# Patient Record
Sex: FEMALE | Employment: OTHER | URBAN - METROPOLITAN AREA
[De-identification: names, ages, dates, MRNs, and addresses within clinical notes are randomized per-mention and may not be internally consistent; named-entity substitution may affect disease eponyms.]

---

## 2018-05-07 ENCOUNTER — HOSPITAL ENCOUNTER (OUTPATIENT)
Age: 83
Discharge: HOME | End: 2018-05-09

## 2018-05-07 PROCEDURE — 88360 TUMOR IMMUNOHISTOCHEM/MANUAL: CPT

## 2018-05-07 PROCEDURE — 88305 TISSUE EXAM BY PATHOLOGIST: CPT

## 2018-10-09 ENCOUNTER — PREP FOR PROCEDURE (OUTPATIENT)
Dept: SURGERY | Age: 83
End: 2018-10-09

## 2018-10-09 RX ORDER — SODIUM CHLORIDE, SODIUM LACTATE, POTASSIUM CHLORIDE, CALCIUM CHLORIDE 600; 310; 30; 20 MG/100ML; MG/100ML; MG/100ML; MG/100ML
INJECTION, SOLUTION INTRAVENOUS CONTINUOUS
Status: CANCELLED | OUTPATIENT
Start: 2018-10-09 | End: 2019-10-09

## 2018-10-09 RX ORDER — SODIUM CHLORIDE 0.9 % (FLUSH) 0.9 %
10 SYRINGE (ML) INJECTION EVERY 12 HOURS SCHEDULED
Status: CANCELLED | OUTPATIENT
Start: 2018-10-09 | End: 2019-10-09

## 2018-10-09 NOTE — H&P
Date:   10/08/18COMPREHENSIVE SURGICAL GROUP [facility]  Name: So Selby                   : 1935 Sex: F  Age: 81 yrs  Acct#:  [id number]            Patient was referred by Sugar Del Valle MD.  Patient's primary care provider is Sugar Del Valle MD.  CC: Patient presents for follow up of chemotherapy of right breast    HPI: Follow-up. Patient denied any new complaints. Meds Prior to Visit:  Synthroid     Amitriptyline HCL     Losartan Potassium     Omeprazole     Citalopram Hydrobromide     Klor-Con M20     Ferrous Sulfate        Allergies:  Penicillin, Aspirin         Wt Prior: 155lb as of 10/06/15    Exam:  Const: Appears healthy and well developed. No signs of apparent distress present. Head/Face: Atraumatic, normocephalic on inspection. Eyes: Conjunctivae pink. Pupils equal, round and reactive to light. Sclerae clear and anicteric. ENMT: External ears WNL. External nose WNL. Lips: Appear normal and healthy. Neck: Normal to inspection. Normal to palpation. No masses appreciated. Trachea midline. Thyroid is normal in size. No jugular venous distention. Resp: Respiration rate is normal. No wheezing. Clear to auscultation bilaterally. No rales or rhonchi appreciated over the lungs bilaterally. CV: Rate is regular. Rhythm is regular. S1 is normal. S2 is normal. No heart murmur appreciated. Extremities: No clubbing or cyanosis. No edema of the lower limbs bilaterally. Abdomen: No visible herniations. No abdominal scars. Positive bowel sounds in all quadrants. Abdomen is soft, nontender, and nondistended without guarding, rigidity or rebound tenderness. No palpable abdominal aneurysms present. No palpable hepatosplenomegaly. Lymph: No palpable lymphadenopathy in the cervical, supraclavicular, axillary and inguinal region(s). Musculo: Walks with a normal gait. Upper Extremities: Normal to inspection. ROM: Full ROM bilaterally. Lower Extremities: Normal to inspection. ROM: Full ROM bilaterally.   Skin: Skin

## 2018-10-23 RX ORDER — LOPERAMIDE HYDROCHLORIDE 2 MG/1
2 CAPSULE ORAL 4 TIMES DAILY PRN
COMMUNITY
End: 2019-09-25

## 2018-10-26 ENCOUNTER — APPOINTMENT (OUTPATIENT)
Dept: GENERAL RADIOLOGY | Age: 83
End: 2018-10-26
Attending: SURGERY
Payer: MEDICARE

## 2018-10-26 ENCOUNTER — HOSPITAL ENCOUNTER (OUTPATIENT)
Age: 83
Setting detail: OUTPATIENT SURGERY
Discharge: HOME | End: 2018-10-26
Attending: SURGERY | Admitting: SURGERY
Payer: MEDICARE

## 2018-10-26 ENCOUNTER — ANESTHESIA EVENT (OUTPATIENT)
Dept: OPERATING ROOM | Age: 83
End: 2018-10-26
Payer: MEDICARE

## 2018-10-26 ENCOUNTER — HOSPITAL ENCOUNTER (OUTPATIENT)
Dept: GENERAL RADIOLOGY | Age: 83
Discharge: HOME | End: 2018-10-28
Attending: SURGERY
Payer: MEDICARE

## 2018-10-26 ENCOUNTER — ANESTHESIA (OUTPATIENT)
Dept: OPERATING ROOM | Age: 83
End: 2018-10-26
Payer: MEDICARE

## 2018-10-26 ENCOUNTER — HOSPITAL ENCOUNTER (OUTPATIENT)
Dept: NUCLEAR MEDICINE | Age: 83
Discharge: HOME | End: 2018-10-28
Payer: MEDICARE

## 2018-10-26 VITALS
OXYGEN SATURATION: 94 % | SYSTOLIC BLOOD PRESSURE: 141 MMHG | DIASTOLIC BLOOD PRESSURE: 109 MMHG | RESPIRATION RATE: 23 BRPM | TEMPERATURE: 98.1 F

## 2018-10-26 VITALS
OXYGEN SATURATION: 92 % | BODY MASS INDEX: 26.5 KG/M2 | HEIGHT: 62 IN | SYSTOLIC BLOOD PRESSURE: 133 MMHG | DIASTOLIC BLOOD PRESSURE: 78 MMHG | TEMPERATURE: 97.7 F | HEART RATE: 80 BPM | WEIGHT: 144 LBS | RESPIRATION RATE: 19 BRPM

## 2018-10-26 DIAGNOSIS — C50.911 MALIGNANT NEOPLASM OF RIGHT FEMALE BREAST, UNSPECIFIED ESTROGEN RECEPTOR STATUS, UNSPECIFIED SITE OF BREAST (HCC): ICD-10-CM

## 2018-10-26 DIAGNOSIS — C50.011 MALIGNANT NEOPLASM OF NIPPLE AND AREOLA OF FEMALE BREAST, RIGHT (HCC): ICD-10-CM

## 2018-10-26 DIAGNOSIS — Z01.812 PRE-OPERATIVE LABORATORY EXAMINATION: Primary | ICD-10-CM

## 2018-10-26 LAB
ANION GAP SERPL CALCULATED.3IONS-SCNC: 15 MMOL/L (ref 7–16)
BUN BLDV-MCNC: 16 MG/DL (ref 8–23)
CALCIUM SERPL-MCNC: 9.1 MG/DL (ref 8.6–10.2)
CHLORIDE BLD-SCNC: 107 MMOL/L (ref 98–107)
CO2: 17 MMOL/L (ref 22–29)
CREAT SERPL-MCNC: 1 MG/DL (ref 0.5–1)
GFR AFRICAN AMERICAN: >60
GFR NON-AFRICAN AMERICAN: 53 ML/MIN/1.73
GLUCOSE BLD-MCNC: 106 MG/DL (ref 74–109)
HCT VFR BLD CALC: 36.5 % (ref 34–48)
HEMOGLOBIN: 11.5 G/DL (ref 11.5–15.5)
MCH RBC QN AUTO: 32.9 PG (ref 26–35)
MCHC RBC AUTO-ENTMCNC: 31.5 % (ref 32–34.5)
MCV RBC AUTO: 104.3 FL (ref 80–99.9)
PDW BLD-RTO: 18.1 FL (ref 11.5–15)
PLATELET # BLD: 145 E9/L (ref 130–450)
PMV BLD AUTO: 8.7 FL (ref 7–12)
POTASSIUM SERPL-SCNC: 3.6 MMOL/L (ref 3.5–5)
RBC # BLD: 3.5 E12/L (ref 3.5–5.5)
SODIUM BLD-SCNC: 139 MMOL/L (ref 132–146)
WBC # BLD: 7.5 E9/L (ref 4.5–11.5)

## 2018-10-26 PROCEDURE — A9541 TC99M SULFUR COLLOID: HCPCS | Performed by: RADIOLOGY

## 2018-10-26 PROCEDURE — 3600000013 HC SURGERY LEVEL 3 ADDTL 15MIN: Performed by: SURGERY

## 2018-10-26 PROCEDURE — 85027 COMPLETE CBC AUTOMATED: CPT

## 2018-10-26 PROCEDURE — 2500000003 HC RX 250 WO HCPCS: Performed by: NURSE ANESTHETIST, CERTIFIED REGISTERED

## 2018-10-26 PROCEDURE — 6360000002 HC RX W HCPCS: Performed by: NURSE ANESTHETIST, CERTIFIED REGISTERED

## 2018-10-26 PROCEDURE — 7100000011 HC PHASE II RECOVERY - ADDTL 15 MIN: Performed by: SURGERY

## 2018-10-26 PROCEDURE — 3430000000 HC RX DIAGNOSTIC RADIOPHARMACEUTICAL: Performed by: RADIOLOGY

## 2018-10-26 PROCEDURE — 6360000002 HC RX W HCPCS: Performed by: SURGERY

## 2018-10-26 PROCEDURE — 7100000000 HC PACU RECOVERY - FIRST 15 MIN: Performed by: SURGERY

## 2018-10-26 PROCEDURE — 36415 COLL VENOUS BLD VENIPUNCTURE: CPT

## 2018-10-26 PROCEDURE — 7100000001 HC PACU RECOVERY - ADDTL 15 MIN: Performed by: SURGERY

## 2018-10-26 PROCEDURE — 3700000001 HC ADD 15 MINUTES (ANESTHESIA): Performed by: SURGERY

## 2018-10-26 PROCEDURE — 80048 BASIC METABOLIC PNL TOTAL CA: CPT

## 2018-10-26 PROCEDURE — 2709999900 HC NON-CHARGEABLE SUPPLY: Performed by: SURGERY

## 2018-10-26 PROCEDURE — 3700000000 HC ANESTHESIA ATTENDED CARE: Performed by: SURGERY

## 2018-10-26 PROCEDURE — 2500000003 HC RX 250 WO HCPCS: Performed by: SURGERY

## 2018-10-26 PROCEDURE — 38792 RA TRACER ID OF SENTINL NODE: CPT

## 2018-10-26 PROCEDURE — 2580000003 HC RX 258: Performed by: SURGERY

## 2018-10-26 PROCEDURE — 3600000003 HC SURGERY LEVEL 3 BASE: Performed by: SURGERY

## 2018-10-26 PROCEDURE — 19281 PERQ DEVICE BREAST 1ST IMAG: CPT

## 2018-10-26 PROCEDURE — 88307 TISSUE EXAM BY PATHOLOGIST: CPT

## 2018-10-26 PROCEDURE — 7100000010 HC PHASE II RECOVERY - FIRST 15 MIN: Performed by: SURGERY

## 2018-10-26 RX ORDER — FENTANYL CITRATE 50 UG/ML
INJECTION, SOLUTION INTRAMUSCULAR; INTRAVENOUS PRN
Status: DISCONTINUED | OUTPATIENT
Start: 2018-10-26 | End: 2018-10-26 | Stop reason: SDUPTHER

## 2018-10-26 RX ORDER — LIDOCAINE HYDROCHLORIDE AND EPINEPHRINE BITARTRATE 20; .01 MG/ML; MG/ML
INJECTION, SOLUTION SUBCUTANEOUS PRN
Status: DISCONTINUED | OUTPATIENT
Start: 2018-10-26 | End: 2018-10-26 | Stop reason: HOSPADM

## 2018-10-26 RX ORDER — MEPERIDINE HYDROCHLORIDE 50 MG/ML
12.5 INJECTION INTRAMUSCULAR; INTRAVENOUS; SUBCUTANEOUS EVERY 5 MIN PRN
Status: DISCONTINUED | OUTPATIENT
Start: 2018-10-26 | End: 2018-10-26 | Stop reason: HOSPADM

## 2018-10-26 RX ORDER — LIDOCAINE HYDROCHLORIDE 20 MG/ML
INJECTION, SOLUTION INFILTRATION; PERINEURAL PRN
Status: DISCONTINUED | OUTPATIENT
Start: 2018-10-26 | End: 2018-10-26 | Stop reason: SDUPTHER

## 2018-10-26 RX ORDER — LABETALOL HYDROCHLORIDE 5 MG/ML
5 INJECTION, SOLUTION INTRAVENOUS EVERY 10 MIN PRN
Status: DISCONTINUED | OUTPATIENT
Start: 2018-10-26 | End: 2018-10-26 | Stop reason: HOSPADM

## 2018-10-26 RX ORDER — EPHEDRINE SULFATE 50 MG/ML
INJECTION INTRAVENOUS PRN
Status: DISCONTINUED | OUTPATIENT
Start: 2018-10-26 | End: 2018-10-26 | Stop reason: SDUPTHER

## 2018-10-26 RX ORDER — NEOSTIGMINE METHYLSULFATE 1 MG/ML
INJECTION, SOLUTION INTRAVENOUS PRN
Status: DISCONTINUED | OUTPATIENT
Start: 2018-10-26 | End: 2018-10-26 | Stop reason: SDUPTHER

## 2018-10-26 RX ORDER — DEXAMETHASONE SODIUM PHOSPHATE 10 MG/ML
INJECTION, SOLUTION INTRAMUSCULAR; INTRAVENOUS PRN
Status: DISCONTINUED | OUTPATIENT
Start: 2018-10-26 | End: 2018-10-26 | Stop reason: SDUPTHER

## 2018-10-26 RX ORDER — PROMETHAZINE HYDROCHLORIDE 25 MG/ML
6.25 INJECTION, SOLUTION INTRAMUSCULAR; INTRAVENOUS
Status: DISCONTINUED | OUTPATIENT
Start: 2018-10-26 | End: 2018-10-26 | Stop reason: HOSPADM

## 2018-10-26 RX ORDER — SODIUM CHLORIDE 0.9 % (FLUSH) 0.9 %
10 SYRINGE (ML) INJECTION EVERY 12 HOURS SCHEDULED
Status: DISCONTINUED | OUTPATIENT
Start: 2018-10-26 | End: 2018-10-26 | Stop reason: HOSPADM

## 2018-10-26 RX ORDER — HYDRALAZINE HYDROCHLORIDE 20 MG/ML
5 INJECTION INTRAMUSCULAR; INTRAVENOUS EVERY 10 MIN PRN
Status: DISCONTINUED | OUTPATIENT
Start: 2018-10-26 | End: 2018-10-26 | Stop reason: HOSPADM

## 2018-10-26 RX ORDER — OXYCODONE HYDROCHLORIDE AND ACETAMINOPHEN 5; 325 MG/1; MG/1
1 TABLET ORAL EVERY 6 HOURS PRN
Qty: 20 TABLET | Refills: 0 | Status: SHIPPED | OUTPATIENT
Start: 2018-10-26 | End: 2018-10-31

## 2018-10-26 RX ORDER — ONDANSETRON 2 MG/ML
INJECTION INTRAMUSCULAR; INTRAVENOUS PRN
Status: DISCONTINUED | OUTPATIENT
Start: 2018-10-26 | End: 2018-10-26 | Stop reason: SDUPTHER

## 2018-10-26 RX ORDER — PROPOFOL 10 MG/ML
INJECTION, EMULSION INTRAVENOUS PRN
Status: DISCONTINUED | OUTPATIENT
Start: 2018-10-26 | End: 2018-10-26 | Stop reason: SDUPTHER

## 2018-10-26 RX ORDER — CEFAZOLIN SODIUM 1 G/3ML
INJECTION, POWDER, FOR SOLUTION INTRAMUSCULAR; INTRAVENOUS PRN
Status: DISCONTINUED | OUTPATIENT
Start: 2018-10-26 | End: 2018-10-26 | Stop reason: SDUPTHER

## 2018-10-26 RX ORDER — MORPHINE SULFATE 2 MG/ML
2 INJECTION, SOLUTION INTRAMUSCULAR; INTRAVENOUS EVERY 5 MIN PRN
Status: DISCONTINUED | OUTPATIENT
Start: 2018-10-26 | End: 2018-10-26 | Stop reason: HOSPADM

## 2018-10-26 RX ORDER — GLYCOPYRROLATE 1 MG/5 ML
SYRINGE (ML) INTRAVENOUS PRN
Status: DISCONTINUED | OUTPATIENT
Start: 2018-10-26 | End: 2018-10-26 | Stop reason: SDUPTHER

## 2018-10-26 RX ORDER — ROCURONIUM BROMIDE 10 MG/ML
INJECTION, SOLUTION INTRAVENOUS PRN
Status: DISCONTINUED | OUTPATIENT
Start: 2018-10-26 | End: 2018-10-26 | Stop reason: SDUPTHER

## 2018-10-26 RX ORDER — METHYLENE BLUE 10 MG/ML
INJECTION INTRAVENOUS PRN
Status: DISCONTINUED | OUTPATIENT
Start: 2018-10-26 | End: 2018-10-26 | Stop reason: HOSPADM

## 2018-10-26 RX ORDER — SODIUM CHLORIDE, SODIUM LACTATE, POTASSIUM CHLORIDE, CALCIUM CHLORIDE 600; 310; 30; 20 MG/100ML; MG/100ML; MG/100ML; MG/100ML
INJECTION, SOLUTION INTRAVENOUS CONTINUOUS
Status: DISCONTINUED | OUTPATIENT
Start: 2018-10-26 | End: 2018-10-26 | Stop reason: HOSPADM

## 2018-10-26 RX ADMIN — CEFAZOLIN 2000 MG: 1 INJECTION, POWDER, FOR SOLUTION INTRAVENOUS at 14:15

## 2018-10-26 RX ADMIN — Medication 2 MG: at 15:14

## 2018-10-26 RX ADMIN — LIDOCAINE HYDROCHLORIDE 100 MG: 20 INJECTION, SOLUTION INFILTRATION; PERINEURAL at 14:10

## 2018-10-26 RX ADMIN — SODIUM CHLORIDE, POTASSIUM CHLORIDE, SODIUM LACTATE AND CALCIUM CHLORIDE: 600; 310; 30; 20 INJECTION, SOLUTION INTRAVENOUS at 14:05

## 2018-10-26 RX ADMIN — FENTANYL CITRATE 20 MCG: 50 INJECTION, SOLUTION INTRAMUSCULAR; INTRAVENOUS at 15:00

## 2018-10-26 RX ADMIN — SODIUM CHLORIDE, POTASSIUM CHLORIDE, SODIUM LACTATE AND CALCIUM CHLORIDE: 600; 310; 30; 20 INJECTION, SOLUTION INTRAVENOUS at 14:50

## 2018-10-26 RX ADMIN — FENTANYL CITRATE 100 MCG: 50 INJECTION, SOLUTION INTRAMUSCULAR; INTRAVENOUS at 14:10

## 2018-10-26 RX ADMIN — EPHEDRINE SULFATE 5 MG: 50 INJECTION, SOLUTION INTRAVENOUS at 14:34

## 2018-10-26 RX ADMIN — PHENYLEPHRINE HYDROCHLORIDE 50 MCG: 10 INJECTION INTRAVENOUS at 14:18

## 2018-10-26 RX ADMIN — PHENYLEPHRINE HYDROCHLORIDE 100 MCG: 10 INJECTION INTRAVENOUS at 15:14

## 2018-10-26 RX ADMIN — DEXAMETHASONE SODIUM PHOSPHATE 10 MG: 10 INJECTION INTRAMUSCULAR; INTRAVENOUS at 14:24

## 2018-10-26 RX ADMIN — Medication 500 MICRO CURIE: at 12:13

## 2018-10-26 RX ADMIN — PROPOFOL 150 MG: 10 INJECTION, EMULSION INTRAVENOUS at 14:10

## 2018-10-26 RX ADMIN — ONDANSETRON HYDROCHLORIDE 4 MG: 2 INJECTION, SOLUTION INTRAMUSCULAR; INTRAVENOUS at 15:09

## 2018-10-26 RX ADMIN — Medication 0.4 MG: at 15:14

## 2018-10-26 RX ADMIN — PHENYLEPHRINE HYDROCHLORIDE 100 MCG: 10 INJECTION INTRAVENOUS at 14:21

## 2018-10-26 RX ADMIN — PHENYLEPHRINE HYDROCHLORIDE 200 MCG: 10 INJECTION INTRAVENOUS at 15:19

## 2018-10-26 RX ADMIN — PHENYLEPHRINE HYDROCHLORIDE 50 MCG: 10 INJECTION INTRAVENOUS at 14:16

## 2018-10-26 RX ADMIN — PHENYLEPHRINE HYDROCHLORIDE 100 MCG: 10 INJECTION INTRAVENOUS at 14:24

## 2018-10-26 RX ADMIN — ROCURONIUM BROMIDE 35 MG: 10 INJECTION INTRAVENOUS at 14:10

## 2018-10-26 ASSESSMENT — PULMONARY FUNCTION TESTS
PIF_VALUE: 18
PIF_VALUE: 3
PIF_VALUE: 18
PIF_VALUE: 16
PIF_VALUE: 12
PIF_VALUE: 5
PIF_VALUE: 13
PIF_VALUE: 18
PIF_VALUE: 3
PIF_VALUE: 12
PIF_VALUE: 11
PIF_VALUE: 17
PIF_VALUE: 17
PIF_VALUE: 18
PIF_VALUE: 18
PIF_VALUE: 1
PIF_VALUE: 19
PIF_VALUE: 18
PIF_VALUE: 2
PIF_VALUE: 20
PIF_VALUE: 16
PIF_VALUE: 13
PIF_VALUE: 12
PIF_VALUE: 19
PIF_VALUE: 2
PIF_VALUE: 13
PIF_VALUE: 0
PIF_VALUE: 18
PIF_VALUE: 18
PIF_VALUE: 12
PIF_VALUE: 15
PIF_VALUE: 12
PIF_VALUE: 19
PIF_VALUE: 18
PIF_VALUE: 20
PIF_VALUE: 4
PIF_VALUE: 5
PIF_VALUE: 15
PIF_VALUE: 0
PIF_VALUE: 20
PIF_VALUE: 15
PIF_VALUE: 4
PIF_VALUE: 1
PIF_VALUE: 17
PIF_VALUE: 17
PIF_VALUE: 16
PIF_VALUE: 20
PIF_VALUE: 20
PIF_VALUE: 18
PIF_VALUE: 3
PIF_VALUE: 15
PIF_VALUE: 15
PIF_VALUE: 1
PIF_VALUE: 15
PIF_VALUE: 17
PIF_VALUE: 16
PIF_VALUE: 1
PIF_VALUE: 18
PIF_VALUE: 18
PIF_VALUE: 17
PIF_VALUE: 18
PIF_VALUE: 3
PIF_VALUE: 4
PIF_VALUE: 16
PIF_VALUE: 19
PIF_VALUE: 16
PIF_VALUE: 4
PIF_VALUE: 18
PIF_VALUE: 17
PIF_VALUE: 3
PIF_VALUE: 3
PIF_VALUE: 18
PIF_VALUE: 2
PIF_VALUE: 18
PIF_VALUE: 0
PIF_VALUE: 13
PIF_VALUE: 18
PIF_VALUE: 17
PIF_VALUE: 4
PIF_VALUE: 18

## 2018-10-26 ASSESSMENT — PAIN SCALES - GENERAL
PAINLEVEL_OUTOF10: 0

## 2018-10-26 ASSESSMENT — PAIN - FUNCTIONAL ASSESSMENT: PAIN_FUNCTIONAL_ASSESSMENT: 0-10

## 2018-10-26 NOTE — PROGRESS NOTES
Admitted to Same Day Surgery Unit. Preop instructions given to patient & family.
Dictation number    [id number]    Jumana Pearson MD
remainder of the day due to having had anesthesia. PARKING INSTRUCTIONS:   · [x] Arrival [id number][x] Parking lot 1 is located on [facility] (the corner of [facility] and [facility]). To enter, press the button and the gate will lift. A free token will be provided to exit the lot. One car per patient is allowed to park in this lot. All other cars are to park on [address] [place] [address] Street either in the parking garage or the handicap lot. [] Free  parking is available on [address] [place] [address]. · [] To reach the [facility] lobby from [address] [place] [address], upon entering the hospital, take elevator B to the 3rd floor. EDUCATION INSTRUCTIONS:      [] Knee or hip replacement booklet & exercise pamphlets given. [] Bert Mckinley placed in chart. [] Pre-admission Testing educational folder given  [] Incentive Spirometry,coughing & deep breathing exercises reviewed. []Medication information sheet(s)   []Fluoroscopy-Xray used in surgery reviewed with patient. Educational pamphlet placed in chart. [x]Pain: Post-op pain is normal and to be expected. You will be asked to rate your pain from 0-10(a zero is not acceptable-education is needed). Your post-op pain goal is:5  [] Ask your nurse for your pain medication. [] Joint camp offered. [] Joint replacement booklets given. []Other     MEDICATION INSTRUCTIONS:   [x]Bring a complete list of your medications, please write the last time you took the medicine, give this list to the nurse. [x] Take the following medications the morning of surgery with 1-2 ounces of water:   [x] Stop herbal supplements and vitamins 5 days before your surgery. [] DO NOT take any diabetic medicine the morning of surgery. Follow instructions for insulin the day before surgery. [] If you are diabetic and your blood sugar is low or you feel symptomatic, you may drink 1-2 ounces of apple juice or take a glucose tablet.   The morning of your procedure, you may call

## 2018-10-26 NOTE — ANESTHESIA PRE PROCEDURE
Department of Anesthesiology  Preprocedure Note       Name:  Purnima Faith   Age:  81 y.o.  :  1935                                          MRN:  [id number]         Date:  10/26/2018      Surgeon: Cate Patiño):  Ruben Ponce MD    Procedure: NEEDLE LOCALIZATION RIGHT BREAST LUMPECTOMY WITH SENTINEL NODE, BLUE DYE INJECTION WITH  Noretta Vienna (Right )    Medications prior to admission:   Prior to Admission medications    Medication Sig Start Date End Date Taking? Authorizing Provider   loperamide (IMODIUM) 2 MG capsule Take 2 mg by mouth 4 times daily as needed for Diarrhea   Yes Historical Provider, MD   levothyroxine (SYNTHROID) 75 MCG tablet Take 75 mcg by mouth Daily   Yes Historical Provider, MD   omeprazole (PRILOSEC) 20 MG capsule Take 20 mg by mouth daily Instructed to take am of procedure   Yes Historical Provider, MD   amitriptyline (ELAVIL) 10 MG tablet Take 10 mg by mouth daily    Yes Historical Provider, MD   pravastatin (PRAVACHOL) 80 MG tablet Take 80 mg by mouth daily   Yes Historical Provider, MD   losartan-hydrochlorothiazide (HYZAAR) 50-12.5 MG per tablet Take 1 tablet by mouth daily   Yes Historical Provider, MD   potassium chloride SA (KLOR-CON M20) 20 MEQ tablet Take 20 mEq by mouth daily   Yes Historical Provider, MD   citalopram (CELEXA) 20 MG tablet Take 20 mg by mouth daily Instructed to take am of procedure   Yes Historical Provider, MD   OXYGEN Inhale into the lungs as needed    Yes Historical Provider, MD       Current medications:    Current Facility-Administered Medications   Medication Dose Route Frequency Provider Last Rate Last Dose    lactated ringers infusion   Intravenous Continuous Ruben Ponce MD        sodium chloride flush 0.9 % injection 10 mL  10 mL Intravenous 2 times per day Ruben Ponce MD           Allergies:     Allergies   Allergen Reactions    Penicillins Itching and Swelling    Aspirin Nausea And Vomiting       Problem List:    Patient Active Problem List Diagnosis Code    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    Pulmonary fibrosis ([facility] J84.10    Essential hypertension I10    Acquired hypothyroidism E03.9    High anion gap metabolic acidosis E70.7    Lactic acidosis E87.2    Gastroenteritis K52.9    Acute kidney injury ([facility] N17.9       Past Medical History:        Diagnosis Date    Anemia     Colon cancer ([facility] 11/15    Hypertension     On home oxygen therapy     3L PRN     Pulmonary fibrosis ([facility]     Thyroid disease        Past Surgical History:        Procedure Laterality Date    APPENDECTOMY      CHOLECYSTECTOMY      COLONOSCOPY      ENDOSCOPY, COLON, DIAGNOSTIC      HEMICOLECTOMY Right 11/12/2015    lysis of adhesions    HYSTERECTOMY      OTHER SURGICAL HISTORY Left 12/7/2015    insertion of mediport left    ROTATOR CUFF REPAIR      THYROID SURGERY      GOITER SURGERY    TONSILLECTOMY      UPPER GASTROINTESTINAL ENDOSCOPY  12/7/2015       Social History:    Social History   Substance Use Topics    Smoking status: Former Smoker    Smokeless tobacco: Never Used      Comment: QUIT 25 YEARS AGO    Alcohol use No                                Counseling given: Not Answered      Vital Signs (Current):   Vitals:    10/23/18 1113 10/26/18 1023   BP:  120/62   Pulse:  90   Resp:  24   Temp:  36.7 °C (98 °F)   TempSrc:  Temporal   SpO2:  92%   Weight: 144 lb (65.3 kg) 144 lb (65.3 kg)   Height: 5' (1.524 m) 5' 2\" (1.575 m)                                              BP Readings from Last 3 Encounters:   10/26/18 120/62   06/13/16 143/65   12/07/15 135/77       NPO Status: Time of last liquid consumption: 2300                        Time of last solid consumption: 1600                        Date of last liquid consumption: 10/25/18                        Date of last solid food consumption: 10/25/18    BMI:   Wt Readings from Last 3 Encounters:   10/26/18 144 lb (65.3 kg)   06/10/16 149 lb 1.6 oz (67.6 kg)   12/07/15 144 lb (65.3 kg)     Body mass

## 2018-11-11 ENCOUNTER — APPOINTMENT (OUTPATIENT)
Dept: GENERAL RADIOLOGY | Age: 83
End: 2018-11-11
Payer: MEDICARE

## 2018-11-11 ENCOUNTER — HOSPITAL ENCOUNTER (EMERGENCY)
Age: 83
Discharge: HOME | End: 2018-11-11
Attending: EMERGENCY MEDICINE
Payer: MEDICARE

## 2018-11-11 VITALS
HEIGHT: 60 IN | WEIGHT: 142 LBS | OXYGEN SATURATION: 94 % | RESPIRATION RATE: 20 BRPM | SYSTOLIC BLOOD PRESSURE: 106 MMHG | HEART RATE: 83 BPM | DIASTOLIC BLOOD PRESSURE: 72 MMHG | BODY MASS INDEX: 27.88 KG/M2 | TEMPERATURE: 97.3 F

## 2018-11-11 DIAGNOSIS — S42.214A CLOSED NONDISPLACED FRACTURE OF SURGICAL NECK OF RIGHT HUMERUS, UNSPECIFIED FRACTURE MORPHOLOGY, INITIAL ENCOUNTER: Primary | ICD-10-CM

## 2018-11-11 LAB
ALBUMIN SERPL-MCNC: 3.6 G/DL (ref 3.5–5.2)
ALP BLD-CCNC: 77 U/L (ref 35–104)
ALT SERPL-CCNC: 6 U/L (ref 0–32)
ANION GAP SERPL CALCULATED.3IONS-SCNC: 17 MMOL/L (ref 7–16)
APTT: 27.5 SEC (ref 24.5–35.1)
AST SERPL-CCNC: 10 U/L (ref 0–31)
BASOPHILS ABSOLUTE: 0.03 E9/L (ref 0–0.2)
BASOPHILS RELATIVE PERCENT: 0.3 % (ref 0–2)
BILIRUB SERPL-MCNC: 1 MG/DL (ref 0–1.2)
BUN BLDV-MCNC: 39 MG/DL (ref 8–23)
CALCIUM SERPL-MCNC: 9.1 MG/DL (ref 8.6–10.2)
CHLORIDE BLD-SCNC: 105 MMOL/L (ref 98–107)
CO2: 15 MMOL/L (ref 22–29)
CREAT SERPL-MCNC: 1.3 MG/DL (ref 0.5–1)
EKG ATRIAL RATE: 88 BPM
EKG P AXIS: 55 DEGREES
EKG P-R INTERVAL: 166 MS
EKG Q-T INTERVAL: 378 MS
EKG QRS DURATION: 66 MS
EKG QTC CALCULATION (BAZETT): 457 MS
EKG R AXIS: 29 DEGREES
EKG T AXIS: 62 DEGREES
EKG VENTRICULAR RATE: 88 BPM
EOSINOPHILS ABSOLUTE: 0.1 E9/L (ref 0.05–0.5)
EOSINOPHILS RELATIVE PERCENT: 1.1 % (ref 0–6)
GFR AFRICAN AMERICAN: 47
GFR NON-AFRICAN AMERICAN: 39 ML/MIN/1.73
GLUCOSE BLD-MCNC: 95 MG/DL (ref 74–99)
HCT VFR BLD CALC: 33.7 % (ref 34–48)
HEMOGLOBIN: 10.3 G/DL (ref 11.5–15.5)
IMMATURE GRANULOCYTES #: 0.06 E9/L
IMMATURE GRANULOCYTES %: 0.7 % (ref 0–5)
INR BLD: 1.1
LACTIC ACID: 1.2 MMOL/L (ref 0.5–2.2)
LIPASE: 25 U/L (ref 13–60)
LYMPHOCYTES ABSOLUTE: 1.54 E9/L (ref 1.5–4)
LYMPHOCYTES RELATIVE PERCENT: 16.8 % (ref 20–42)
MAGNESIUM: 1.3 MG/DL (ref 1.6–2.6)
MCH RBC QN AUTO: 32 PG (ref 26–35)
MCHC RBC AUTO-ENTMCNC: 30.6 % (ref 32–34.5)
MCV RBC AUTO: 104.7 FL (ref 80–99.9)
MONOCYTES ABSOLUTE: 0.66 E9/L (ref 0.1–0.95)
MONOCYTES RELATIVE PERCENT: 7.2 % (ref 2–12)
NEUTROPHILS ABSOLUTE: 6.8 E9/L (ref 1.8–7.3)
NEUTROPHILS RELATIVE PERCENT: 73.9 % (ref 43–80)
PDW BLD-RTO: 16.1 FL (ref 11.5–15)
PLATELET # BLD: 220 E9/L (ref 130–450)
PMV BLD AUTO: 10 FL (ref 7–12)
POTASSIUM SERPL-SCNC: 4.3 MMOL/L (ref 3.5–5)
PRO-BNP: 2395 PG/ML (ref 0–450)
PROTHROMBIN TIME: 12.8 SEC (ref 9.3–12.4)
RBC # BLD: 3.22 E12/L (ref 3.5–5.5)
SODIUM BLD-SCNC: 137 MMOL/L (ref 132–146)
TOTAL CK: 216 U/L (ref 20–180)
TOTAL PROTEIN: 6.9 G/DL (ref 6.4–8.3)
TROPONIN: <0.01 NG/ML (ref 0–0.03)
WBC # BLD: 9.2 E9/L (ref 4.5–11.5)

## 2018-11-11 PROCEDURE — 73060 X-RAY EXAM OF HUMERUS: CPT

## 2018-11-11 PROCEDURE — 85610 PROTHROMBIN TIME: CPT

## 2018-11-11 PROCEDURE — 84484 ASSAY OF TROPONIN QUANT: CPT

## 2018-11-11 PROCEDURE — 80053 COMPREHEN METABOLIC PANEL: CPT

## 2018-11-11 PROCEDURE — 83690 ASSAY OF LIPASE: CPT

## 2018-11-11 PROCEDURE — 36415 COLL VENOUS BLD VENIPUNCTURE: CPT

## 2018-11-11 PROCEDURE — 85730 THROMBOPLASTIN TIME PARTIAL: CPT

## 2018-11-11 PROCEDURE — 83605 ASSAY OF LACTIC ACID: CPT

## 2018-11-11 PROCEDURE — 83735 ASSAY OF MAGNESIUM: CPT

## 2018-11-11 PROCEDURE — 71046 X-RAY EXAM CHEST 2 VIEWS: CPT

## 2018-11-11 PROCEDURE — 2580000003 HC RX 258: Performed by: EMERGENCY MEDICINE

## 2018-11-11 PROCEDURE — 82550 ASSAY OF CK (CPK): CPT

## 2018-11-11 PROCEDURE — 73090 X-RAY EXAM OF FOREARM: CPT

## 2018-11-11 PROCEDURE — 73030 X-RAY EXAM OF SHOULDER: CPT

## 2018-11-11 PROCEDURE — 93005 ELECTROCARDIOGRAM TRACING: CPT | Performed by: NURSE PRACTITIONER

## 2018-11-11 PROCEDURE — 83880 ASSAY OF NATRIURETIC PEPTIDE: CPT

## 2018-11-11 PROCEDURE — 85025 COMPLETE CBC W/AUTO DIFF WBC: CPT

## 2018-11-11 PROCEDURE — 99284 EMERGENCY DEPT VISIT MOD MDM: CPT

## 2018-11-11 RX ORDER — 0.9 % SODIUM CHLORIDE 0.9 %
500 INTRAVENOUS SOLUTION INTRAVENOUS ONCE
Status: COMPLETED | OUTPATIENT
Start: 2018-11-11 | End: 2018-11-11

## 2018-11-11 RX ORDER — HYDROCODONE BITARTRATE AND ACETAMINOPHEN 5; 325 MG/1; MG/1
1 TABLET ORAL EVERY 6 HOURS PRN
Qty: 12 TABLET | Refills: 0 | Status: SHIPPED | OUTPATIENT
Start: 2018-11-11 | End: 2018-11-14

## 2018-11-11 RX ADMIN — SODIUM CHLORIDE 500 ML: 9 INJECTION, SOLUTION INTRAVENOUS at 18:55

## 2018-11-11 ASSESSMENT — PAIN SCALES - GENERAL: PAINLEVEL_OUTOF10: 7

## 2018-11-11 ASSESSMENT — PAIN DESCRIPTION - LOCATION: LOCATION: ARM

## 2018-11-11 ASSESSMENT — PAIN DESCRIPTION - PAIN TYPE: TYPE: ACUTE PAIN

## 2018-11-12 NOTE — ED PROVIDER NOTES
HPI:  11/11/18,   Time: 7:10 PM       Gloria Meyer is a 81 y.o. female presenting to the ED for fall. Patient states she fell in her closet 4 days ago. She describes a mechanical fall, she was not dizzy. She did not lose consciousness. She did not strike her head. She complains of shoulder pain after the fall. The next morning, she developed diffuse ecchymosis over her right upper extremity. She says over the next few days the ecchymosis did improve, however, she says its still painful to use her shoulder. She is on no anticoagulation. She only complains of shoulder pain at this time. Denies pain in the hand, forearm, neck pain, head pain, nausea, vomiting, or any other symptoms. Review of Systems:   Pertinent positives and negatives are stated within HPI, all other systems reviewed and are negative.          --------------------------------------------- PAST HISTORY ---------------------------------------------  Past Medical History:  has a past medical history of Anemia; Colon cancer ([facility] Hypertension; On home oxygen therapy; Pulmonary fibrosis ([facility] and Thyroid disease. Past Surgical History:  has a past surgical history that includes Tonsillectomy; Hysterectomy; Appendectomy; Cholecystectomy; Rotator cuff repair; Thyroid surgery; hemicolectomy (Right, 11/12/2015); Colonoscopy; Endoscopy, colon, diagnostic; Upper gastrointestinal endoscopy (12/7/2015); other surgical history (Left, 12/7/2015); Breast lumpectomy (Right, 10/26/2018); and pr biopsy of breast, needle core (Right, 10/26/2018). Social History:  reports that she has quit smoking. She has never used smokeless tobacco. She reports that she does not drink alcohol or use drugs. Family History: family history is not on file. The patients home medications have been reviewed.     Allergies: Penicillins and Aspirin        ---------------------------------------------------PHYSICAL %    RDW 16.1 (H) 11.5 - 15.0 fL    Platelets 945 633 - 601 E9/L    MPV 10.0 7.0 - 12.0 fL    Neutrophils % 73.9 43.0 - 80.0 %    Immature Granulocytes % 0.7 0.0 - 5.0 %    Lymphocytes % 16.8 (L) 20.0 - 42.0 %    Monocytes % 7.2 2.0 - 12.0 %    Eosinophils % 1.1 0.0 - 6.0 %    Basophils % 0.3 0.0 - 2.0 %    Neutrophils # 6.80 1.80 - 7.30 E9/L    Immature Granulocytes # 0.06 E9/L    Lymphocytes # 1.54 1.50 - 4.00 E9/L    Monocytes # 0.66 0.10 - 0.95 E9/L    Eosinophils # 0.10 0.05 - 0.50 E9/L    Basophils # 0.03 0.00 - 0.20 E9/L   Comprehensive Metabolic Panel   Result Value Ref Range    Sodium 137 132 - 146 mmol/L    Potassium 4.3 3.5 - 5.0 mmol/L    Chloride 105 98 - 107 mmol/L    CO2 15 (L) 22 - 29 mmol/L    Anion Gap 17 (H) 7 - 16 mmol/L    Glucose 95 74 - 99 mg/dL    BUN 39 (H) 8 - 23 mg/dL    CREATININE 1.3 (H) 0.5 - 1.0 mg/dL    GFR Non-African American 39 >=60 mL/min/1.73    GFR African American 47     Calcium 9.1 8.6 - 10.2 mg/dL    Total Protein 6.9 6.4 - 8.3 g/dL    Alb 3.6 3.5 - 5.2 g/dL    Total Bilirubin 1.0 0.0 - 1.2 mg/dL    Alkaline Phosphatase 77 35 - 104 U/L    ALT 6 0 - 32 U/L    AST 10 0 - 31 U/L   Lactic Acid, Plasma   Result Value Ref Range    Lactic Acid 1.2 0.5 - 2.2 mmol/L   Lipase   Result Value Ref Range    Lipase 25 13 - 60 U/L   Troponin   Result Value Ref Range    Troponin <0.01 0.00 - 0.03 ng/mL   Brain Natriuretic Peptide   Result Value Ref Range    Pro-BNP 2,395 (H) 0 - 450 pg/mL   Magnesium   Result Value Ref Range    Magnesium 1.3 (L) 1.6 - 2.6 mg/dL   APTT   Result Value Ref Range    aPTT 27.5 24.5 - 35.1 sec   Protime-INR   Result Value Ref Range    Protime 12.8 (H) 9.3 - 12.4 sec    INR 1.1    CK   Result Value Ref Range    Total  (H) 20 - 180 U/L   EKG 12 Lead   Result Value Ref Range    Ventricular Rate 88 BPM    Atrial Rate 88 BPM    P-R Interval 166 ms    QRS Duration 66 ms    Q-T Interval 378 ms    QTc Calculation (Bazett) 457 ms    P Axis 55 degrees    R Axis 29 improving, she has no signs of cellulitis or any other infectious process at this time. Patient is discharged, she is to follow-up with her PCP along with orthopedics, she is educated on signs and symptoms or require emergent evaluation. Counseling: The emergency provider has spoken with the patient and discussed todays results, in addition to providing specific details for the plan of care and counseling regarding the diagnosis and prognosis. Questions are answered at this time and they are agreeable with the plan.       --------------------------------- IMPRESSION AND DISPOSITION ---------------------------------    IMPRESSION  1. Closed nondisplaced fracture of surgical neck of right humerus, unspecified fracture morphology, initial encounter        DISPOSITION  Disposition: Discharge to home  Patient condition is stable    NOTE: This report was transcribed using voice recognition software.  Every effort was made to ensure accuracy; however, inadvertent computerized transcription errors may be present        Felipe Angelucci, MD  11/11/18 8411

## 2018-11-22 PROBLEM — C50.411 MALIGNANT NEOPLASM OF UPPER-OUTER QUADRANT OF RIGHT BREAST IN FEMALE, ESTROGEN RECEPTOR NEGATIVE (HCC): Status: ACTIVE | Noted: 2018-11-22

## 2018-11-22 PROBLEM — Z17.1 MALIGNANT NEOPLASM OF UPPER-OUTER QUADRANT OF RIGHT BREAST IN FEMALE, ESTROGEN RECEPTOR NEGATIVE (HCC): Status: ACTIVE | Noted: 2018-11-22

## 2019-04-24 ENCOUNTER — APPOINTMENT (OUTPATIENT)
Dept: GENERAL RADIOLOGY | Age: 84
DRG: 312 | End: 2019-04-24
Payer: MEDICARE

## 2019-04-24 ENCOUNTER — APPOINTMENT (OUTPATIENT)
Dept: CT IMAGING | Age: 84
DRG: 312 | End: 2019-04-24
Payer: MEDICARE

## 2019-04-24 ENCOUNTER — HOSPITAL ENCOUNTER (INPATIENT)
Age: 84
LOS: 1 days | Discharge: HOME | DRG: 312 | End: 2019-04-25
Attending: EMERGENCY MEDICINE | Admitting: INTERNAL MEDICINE
Payer: MEDICARE

## 2019-04-24 DIAGNOSIS — N18.9 CHRONIC RENAL FAILURE, UNSPECIFIED CKD STAGE: ICD-10-CM

## 2019-04-24 DIAGNOSIS — I50.9 CONGESTIVE HEART FAILURE, UNSPECIFIED HF CHRONICITY, UNSPECIFIED HEART FAILURE TYPE (HCC): ICD-10-CM

## 2019-04-24 DIAGNOSIS — E83.42 HYPOMAGNESEMIA: ICD-10-CM

## 2019-04-24 DIAGNOSIS — E87.3 RESPIRATORY ALKALOSIS: ICD-10-CM

## 2019-04-24 DIAGNOSIS — T45.1X5A CHEMOTHERAPY-INDUCED LUNG DISEASE: ICD-10-CM

## 2019-04-24 DIAGNOSIS — E03.9 ACQUIRED HYPOTHYROIDISM: ICD-10-CM

## 2019-04-24 DIAGNOSIS — E87.20 METABOLIC ACIDOSIS: ICD-10-CM

## 2019-04-24 DIAGNOSIS — S09.90XA INJURY OF HEAD, INITIAL ENCOUNTER: ICD-10-CM

## 2019-04-24 DIAGNOSIS — J98.4 CHEMOTHERAPY-INDUCED LUNG DISEASE: ICD-10-CM

## 2019-04-24 DIAGNOSIS — R55 SYNCOPE AND COLLAPSE: ICD-10-CM

## 2019-04-24 DIAGNOSIS — S01.01XA LACERATION OF SCALP, INITIAL ENCOUNTER: Primary | ICD-10-CM

## 2019-04-24 DIAGNOSIS — I95.1 ORTHOSTATIC HYPOTENSION: ICD-10-CM

## 2019-04-24 PROBLEM — I95.9 HYPOTENSION: Status: ACTIVE | Noted: 2019-04-24

## 2019-04-24 LAB
ABO/RH: NORMAL
ACETAMINOPHEN LEVEL: <5 MCG/ML (ref 10–30)
ALBUMIN SERPL-MCNC: 4.2 G/DL (ref 3.5–5.2)
ALP BLD-CCNC: 87 U/L (ref 35–104)
ALT SERPL-CCNC: 11 U/L (ref 0–32)
AMPHETAMINE SCREEN, URINE: NOT DETECTED
ANION GAP SERPL CALCULATED.3IONS-SCNC: 16 MMOL/L (ref 7–16)
ANTIBODY SCREEN: NORMAL
APTT: 26.3 SEC (ref 24.5–35.1)
AST SERPL-CCNC: 16 U/L (ref 0–31)
B.E.: -10 MMOL/L (ref -3–3)
BACTERIA: ABNORMAL /HPF
BARBITURATE SCREEN URINE: NOT DETECTED
BASOPHILS ABSOLUTE: 0.05 E9/L (ref 0–0.2)
BASOPHILS RELATIVE PERCENT: 0.5 % (ref 0–2)
BENZODIAZEPINE SCREEN, URINE: NOT DETECTED
BILIRUB SERPL-MCNC: 1 MG/DL (ref 0–1.2)
BILIRUBIN DIRECT: 0.3 MG/DL (ref 0–0.3)
BILIRUBIN URINE: NEGATIVE
BILIRUBIN, INDIRECT: 0.7 MG/DL (ref 0–1)
BLOOD, URINE: NEGATIVE
BUN BLDV-MCNC: 35 MG/DL (ref 8–23)
C-REACTIVE PROTEIN: 0.2 MG/DL (ref 0–0.4)
CALCIUM SERPL-MCNC: 9.1 MG/DL (ref 8.6–10.2)
CANNABINOID SCREEN URINE: NOT DETECTED
CASTS 2: ABNORMAL /LPF
CASTS: ABNORMAL /LPF
CHLORIDE BLD-SCNC: 98 MMOL/L (ref 98–107)
CLARITY: CLEAR
CO2: 18 MMOL/L (ref 22–29)
COCAINE METABOLITE SCREEN URINE: NOT DETECTED
COHB: 1.9 % (ref 0–1.5)
COLOR: YELLOW
CORTISOL TOTAL: 18.02 MCG/DL (ref 2.68–18.4)
CREAT SERPL-MCNC: 1.7 MG/DL (ref 0.5–1)
CRITICAL: ABNORMAL
D DIMER: 396 NG/ML DDU
DATE ANALYZED: ABNORMAL
DATE OF COLLECTION: ABNORMAL
EOSINOPHILS ABSOLUTE: 0.07 E9/L (ref 0.05–0.5)
EOSINOPHILS RELATIVE PERCENT: 0.6 % (ref 0–6)
EPITHELIAL CELLS, UA: ABNORMAL /HPF
ETHANOL: <10 MG/DL (ref 0–0.08)
GFR AFRICAN AMERICAN: 35
GFR NON-AFRICAN AMERICAN: 29 ML/MIN/1.73
GLUCOSE BLD-MCNC: 123 MG/DL (ref 74–99)
GLUCOSE URINE: NEGATIVE MG/DL
HCO3: 14.1 MMOL/L (ref 22–26)
HCT VFR BLD CALC: 41.1 % (ref 34–48)
HEMOGLOBIN: 12.7 G/DL (ref 11.5–15.5)
HHB: 9.3 % (ref 0–5)
IMMATURE GRANULOCYTES #: 0.1 E9/L
IMMATURE GRANULOCYTES %: 0.9 % (ref 0–5)
INR BLD: 1.3
KETONES, URINE: NEGATIVE MG/DL
LAB: ABNORMAL
LACTIC ACID: 2.2 MMOL/L (ref 0.5–2.2)
LACTIC ACID: 3.4 MMOL/L (ref 0.5–2.2)
LEUKOCYTE ESTERASE, URINE: NEGATIVE
LYMPHOCYTES ABSOLUTE: 1.33 E9/L (ref 1.5–4)
LYMPHOCYTES RELATIVE PERCENT: 12 % (ref 20–42)
Lab: ABNORMAL
MAGNESIUM: 1.5 MG/DL (ref 1.6–2.6)
MCH RBC QN AUTO: 27.3 PG (ref 26–35)
MCHC RBC AUTO-ENTMCNC: 30.9 % (ref 32–34.5)
MCV RBC AUTO: 88.2 FL (ref 80–99.9)
METHADONE SCREEN, URINE: NOT DETECTED
METHB: 0 % (ref 0–1.5)
MODE: ABNORMAL
MONOCYTES ABSOLUTE: 0.8 E9/L (ref 0.1–0.95)
MONOCYTES RELATIVE PERCENT: 7.2 % (ref 2–12)
NEUTROPHILS ABSOLUTE: 8.72 E9/L (ref 1.8–7.3)
NEUTROPHILS RELATIVE PERCENT: 78.8 % (ref 43–80)
NITRITE, URINE: POSITIVE
O2 CONTENT: 15.9 ML/DL
O2 SATURATION: 90.5 % (ref 92–98.5)
O2HB: 88.8 % (ref 94–97)
OPERATOR ID: 1190
OPIATE SCREEN URINE: NOT DETECTED
PATIENT TEMP: 37 C
PCO2: 26.4 MMHG (ref 35–45)
PDW BLD-RTO: 19.6 FL (ref 11.5–15)
PH BLOOD GAS: 7.34 (ref 7.35–7.45)
PH UA: 5.5 (ref 5–9)
PHENCYCLIDINE SCREEN URINE: NOT DETECTED
PLATELET # BLD: 201 E9/L (ref 130–450)
PMV BLD AUTO: 9.8 FL (ref 7–12)
PO2: 65.6 MMHG (ref 60–100)
POTASSIUM SERPL-SCNC: 4 MMOL/L (ref 3.5–5)
PRO-BNP: 5809 PG/ML (ref 0–450)
PROPOXYPHENE SCREEN: NOT DETECTED
PROTEIN UA: 100 MG/DL
PROTHROMBIN TIME: 14.3 SEC (ref 9.3–12.4)
RBC # BLD: 4.66 E12/L (ref 3.5–5.5)
RBC UA: ABNORMAL /HPF (ref 0–2)
RENAL EPITHELIAL, UA: ABNORMAL /HPF
SALICYLATE, SERUM: <0.3 MG/DL (ref 0–30)
SEDIMENTATION RATE, ERYTHROCYTE: 0 MM/HR (ref 0–20)
SODIUM BLD-SCNC: 132 MMOL/L (ref 132–146)
SOURCE, BLOOD GAS: ABNORMAL
SPECIFIC GRAVITY UA: 1.01 (ref 1–1.03)
THB: 12.7 G/DL (ref 11.5–16.5)
TIME ANALYZED: 1914
TOTAL PROTEIN: 7.4 G/DL (ref 6.4–8.3)
TRICYCLIC ANTIDEPRESSANTS SCREEN SERUM: NEGATIVE NG/ML
TROPONIN: <0.01 NG/ML (ref 0–0.03)
TSH SERPL DL<=0.05 MIU/L-ACNC: 8.2 UIU/ML (ref 0.27–4.2)
UROBILINOGEN, URINE: 0.2 E.U./DL
WBC # BLD: 11.1 E9/L (ref 4.5–11.5)
WBC UA: ABNORMAL /HPF (ref 0–5)

## 2019-04-24 PROCEDURE — 86140 C-REACTIVE PROTEIN: CPT

## 2019-04-24 PROCEDURE — 2580000003 HC RX 258: Performed by: INTERNAL MEDICINE

## 2019-04-24 PROCEDURE — 6370000000 HC RX 637 (ALT 250 FOR IP): Performed by: EMERGENCY MEDICINE

## 2019-04-24 PROCEDURE — 85378 FIBRIN DEGRADE SEMIQUANT: CPT

## 2019-04-24 PROCEDURE — 2060000000 HC ICU INTERMEDIATE R&B

## 2019-04-24 PROCEDURE — 86900 BLOOD TYPING SEROLOGIC ABO: CPT

## 2019-04-24 PROCEDURE — 51702 INSERT TEMP BLADDER CATH: CPT

## 2019-04-24 PROCEDURE — 2580000003 HC RX 258: Performed by: EMERGENCY MEDICINE

## 2019-04-24 PROCEDURE — 99285 EMERGENCY DEPT VISIT HI MDM: CPT

## 2019-04-24 PROCEDURE — 80048 BASIC METABOLIC PNL TOTAL CA: CPT

## 2019-04-24 PROCEDURE — 82533 TOTAL CORTISOL: CPT

## 2019-04-24 PROCEDURE — 85651 RBC SED RATE NONAUTOMATED: CPT

## 2019-04-24 PROCEDURE — 86850 RBC ANTIBODY SCREEN: CPT

## 2019-04-24 PROCEDURE — 2700000000 HC OXYGEN THERAPY PER DAY

## 2019-04-24 PROCEDURE — 72125 CT NECK SPINE W/O DYE: CPT

## 2019-04-24 PROCEDURE — 80307 DRUG TEST PRSMV CHEM ANLYZR: CPT

## 2019-04-24 PROCEDURE — 94640 AIRWAY INHALATION TREATMENT: CPT

## 2019-04-24 PROCEDURE — 6360000002 HC RX W HCPCS: Performed by: EMERGENCY MEDICINE

## 2019-04-24 PROCEDURE — 85730 THROMBOPLASTIN TIME PARTIAL: CPT

## 2019-04-24 PROCEDURE — 83880 ASSAY OF NATRIURETIC PEPTIDE: CPT

## 2019-04-24 PROCEDURE — 2580000003 HC RX 258: Performed by: NURSE PRACTITIONER

## 2019-04-24 PROCEDURE — 0HQ0XZZ REPAIR SCALP SKIN, EXTERNAL APPROACH: ICD-10-PCS | Performed by: NURSE PRACTITIONER

## 2019-04-24 PROCEDURE — 96365 THER/PROPH/DIAG IV INF INIT: CPT

## 2019-04-24 PROCEDURE — 70450 CT HEAD/BRAIN W/O DYE: CPT

## 2019-04-24 PROCEDURE — 85610 PROTHROMBIN TIME: CPT

## 2019-04-24 PROCEDURE — 12002 RPR S/N/AX/GEN/TRNK2.6-7.5CM: CPT

## 2019-04-24 PROCEDURE — 81001 URINALYSIS AUTO W/SCOPE: CPT

## 2019-04-24 PROCEDURE — G0480 DRUG TEST DEF 1-7 CLASSES: HCPCS

## 2019-04-24 PROCEDURE — 83605 ASSAY OF LACTIC ACID: CPT

## 2019-04-24 PROCEDURE — 83735 ASSAY OF MAGNESIUM: CPT

## 2019-04-24 PROCEDURE — 87040 BLOOD CULTURE FOR BACTERIA: CPT

## 2019-04-24 PROCEDURE — 71045 X-RAY EXAM CHEST 1 VIEW: CPT

## 2019-04-24 PROCEDURE — 96368 THER/DIAG CONCURRENT INF: CPT

## 2019-04-24 PROCEDURE — 84443 ASSAY THYROID STIM HORMONE: CPT

## 2019-04-24 PROCEDURE — 6360000002 HC RX W HCPCS: Performed by: NURSE PRACTITIONER

## 2019-04-24 PROCEDURE — 94664 DEMO&/EVAL PT USE INHALER: CPT

## 2019-04-24 PROCEDURE — 86901 BLOOD TYPING SEROLOGIC RH(D): CPT

## 2019-04-24 PROCEDURE — 80076 HEPATIC FUNCTION PANEL: CPT

## 2019-04-24 PROCEDURE — 87088 URINE BACTERIA CULTURE: CPT

## 2019-04-24 PROCEDURE — 93005 ELECTROCARDIOGRAM TRACING: CPT | Performed by: NURSE PRACTITIONER

## 2019-04-24 PROCEDURE — 85025 COMPLETE CBC W/AUTO DIFF WBC: CPT

## 2019-04-24 PROCEDURE — 84484 ASSAY OF TROPONIN QUANT: CPT

## 2019-04-24 PROCEDURE — 82805 BLOOD GASES W/O2 SATURATION: CPT

## 2019-04-24 PROCEDURE — 36415 COLL VENOUS BLD VENIPUNCTURE: CPT

## 2019-04-24 PROCEDURE — 6370000000 HC RX 637 (ALT 250 FOR IP): Performed by: NURSE PRACTITIONER

## 2019-04-24 RX ORDER — 0.9 % SODIUM CHLORIDE 0.9 %
1000 INTRAVENOUS SOLUTION INTRAVENOUS ONCE
Status: COMPLETED | OUTPATIENT
Start: 2019-04-24 | End: 2019-04-24

## 2019-04-24 RX ORDER — POTASSIUM CHLORIDE 20 MEQ/1
20 TABLET, EXTENDED RELEASE ORAL DAILY
Status: DISCONTINUED | OUTPATIENT
Start: 2019-04-25 | End: 2019-04-25 | Stop reason: HOSPADM

## 2019-04-24 RX ORDER — MAGNESIUM SULFATE IN WATER 40 MG/ML
2 INJECTION, SOLUTION INTRAVENOUS ONCE
Status: COMPLETED | OUTPATIENT
Start: 2019-04-24 | End: 2019-04-24

## 2019-04-24 RX ORDER — ONDANSETRON 2 MG/ML
4 INJECTION INTRAMUSCULAR; INTRAVENOUS EVERY 6 HOURS PRN
Status: DISCONTINUED | OUTPATIENT
Start: 2019-04-24 | End: 2019-04-25 | Stop reason: HOSPADM

## 2019-04-24 RX ORDER — AMITRIPTYLINE HYDROCHLORIDE 10 MG/1
10 TABLET, FILM COATED ORAL DAILY
Status: DISCONTINUED | OUTPATIENT
Start: 2019-04-25 | End: 2019-04-25 | Stop reason: HOSPADM

## 2019-04-24 RX ORDER — SODIUM CHLORIDE 9 MG/ML
INJECTION, SOLUTION INTRAVENOUS ONCE
Status: COMPLETED | OUTPATIENT
Start: 2019-04-24 | End: 2019-04-24

## 2019-04-24 RX ORDER — PANTOPRAZOLE SODIUM 40 MG/1
40 TABLET, DELAYED RELEASE ORAL DAILY
Status: DISCONTINUED | OUTPATIENT
Start: 2019-04-25 | End: 2019-04-25 | Stop reason: HOSPADM

## 2019-04-24 RX ORDER — LEVOTHYROXINE SODIUM 0.07 MG/1
75 TABLET ORAL DAILY
Status: DISCONTINUED | OUTPATIENT
Start: 2019-04-25 | End: 2019-04-25 | Stop reason: HOSPADM

## 2019-04-24 RX ORDER — SODIUM CHLORIDE 0.9 % (FLUSH) 0.9 %
10 SYRINGE (ML) INJECTION EVERY 12 HOURS SCHEDULED
Status: DISCONTINUED | OUTPATIENT
Start: 2019-04-24 | End: 2019-04-25 | Stop reason: HOSPADM

## 2019-04-24 RX ORDER — IPRATROPIUM BROMIDE AND ALBUTEROL SULFATE 2.5; .5 MG/3ML; MG/3ML
1 SOLUTION RESPIRATORY (INHALATION)
Status: COMPLETED | OUTPATIENT
Start: 2019-04-24 | End: 2019-04-24

## 2019-04-24 RX ORDER — LOSARTAN POTASSIUM 50 MG/1
50 TABLET ORAL DAILY
Status: DISCONTINUED | OUTPATIENT
Start: 2019-04-25 | End: 2019-04-25 | Stop reason: HOSPADM

## 2019-04-24 RX ORDER — CITALOPRAM 20 MG/1
20 TABLET ORAL DAILY
Status: DISCONTINUED | OUTPATIENT
Start: 2019-04-25 | End: 2019-04-25 | Stop reason: HOSPADM

## 2019-04-24 RX ORDER — ACETAMINOPHEN 500 MG
1000 TABLET ORAL ONCE
Status: COMPLETED | OUTPATIENT
Start: 2019-04-24 | End: 2019-04-24

## 2019-04-24 RX ORDER — PRAVASTATIN SODIUM 20 MG
80 TABLET ORAL DAILY
Status: DISCONTINUED | OUTPATIENT
Start: 2019-04-25 | End: 2019-04-25 | Stop reason: HOSPADM

## 2019-04-24 RX ORDER — SODIUM CHLORIDE 0.9 % (FLUSH) 0.9 %
10 SYRINGE (ML) INJECTION PRN
Status: DISCONTINUED | OUTPATIENT
Start: 2019-04-24 | End: 2019-04-25 | Stop reason: HOSPADM

## 2019-04-24 RX ORDER — HYDROCHLOROTHIAZIDE 12.5 MG/1
12.5 TABLET ORAL DAILY
Status: DISCONTINUED | OUTPATIENT
Start: 2019-04-25 | End: 2019-04-25 | Stop reason: HOSPADM

## 2019-04-24 RX ORDER — LOSARTAN POTASSIUM AND HYDROCHLOROTHIAZIDE 12.5; 5 MG/1; MG/1
1 TABLET ORAL DAILY
Status: DISCONTINUED | OUTPATIENT
Start: 2019-04-25 | End: 2019-04-24 | Stop reason: CLARIF

## 2019-04-24 RX ADMIN — MAGNESIUM SULFATE HEPTAHYDRATE 2 G: 40 INJECTION, SOLUTION INTRAVENOUS at 18:46

## 2019-04-24 RX ADMIN — IPRATROPIUM BROMIDE AND ALBUTEROL SULFATE 1 AMPULE: .5; 3 SOLUTION RESPIRATORY (INHALATION) at 18:11

## 2019-04-24 RX ADMIN — HYDROCORTISONE SODIUM SUCCINATE 100 MG: 100 INJECTION, POWDER, FOR SOLUTION INTRAMUSCULAR; INTRAVENOUS at 19:51

## 2019-04-24 RX ADMIN — ACETAMINOPHEN 1000 MG: 500 TABLET ORAL at 18:18

## 2019-04-24 RX ADMIN — IPRATROPIUM BROMIDE AND ALBUTEROL SULFATE 1 AMPULE: .5; 3 SOLUTION RESPIRATORY (INHALATION) at 18:12

## 2019-04-24 RX ADMIN — SODIUM CHLORIDE 1000 ML: 9 INJECTION, SOLUTION INTRAVENOUS at 17:15

## 2019-04-24 RX ADMIN — SODIUM CHLORIDE: 9 INJECTION, SOLUTION INTRAVENOUS at 18:03

## 2019-04-24 RX ADMIN — IPRATROPIUM BROMIDE AND ALBUTEROL SULFATE 1 AMPULE: .5; 3 SOLUTION RESPIRATORY (INHALATION) at 18:13

## 2019-04-24 RX ADMIN — Medication 10 ML: at 22:06

## 2019-04-24 RX ADMIN — CEFEPIME 2 G: 2 INJECTION, POWDER, FOR SOLUTION INTRAVENOUS at 18:19

## 2019-04-24 ASSESSMENT — PAIN SCALES - GENERAL
PAINLEVEL_OUTOF10: 0
PAINLEVEL_OUTOF10: 0
PAINLEVEL_OUTOF10: 7

## 2019-04-24 NOTE — ED PROVIDER NOTES
ED Attending  CC: Yes       Department of Emergency Medicine   ED  Provider Note  Admit Date/RoomTime: 4/24/2019  4:55 PM  ED Room: 11/11  Chief Complaint   Fall and Laceration (head)    History of Present Illness   Source of history provided by:  patient. History/Exam Limitations: none. Rohit Medeiros is a 81 y.o. old female who has a past medical history of:   Past Medical History:   Diagnosis Date    Anemia     Colon cancer ([facility] 11/15    Hypertension     On home oxygen therapy     3L PRN     Pulmonary fibrosis ([facility]     Thyroid disease     presents to the emergency department by private vehicle, for a fall which occured 1 hour(s) prior to arrival. She was reportedly at home and said she took a muscle relaxer, turned off home O2 to conserve it, felt dizzy and woke up on the floor . The patients tetanus status is up to date. Since onset the symptoms have been mild in degree. Her pain is aggraveated by palpation and relieved by rest.  She denies any neck pain, chest pain, abdominal pain or extremity injury. .  ROS    Pertinent positives and negatives are stated within HPI, all other systems reviewed and are negative. Past Surgical History:   Procedure Laterality Date    APPENDECTOMY      BREAST LUMPECTOMY Right 10/26/2018    with Dr. Mario Dorman, COLON, DIAGNOSTIC      HEMICOLECTOMY Right 11/12/2015    lysis of adhesions    HYSTERECTOMY      OTHER SURGICAL HISTORY Left 12/7/2015    insertion of mediport left    DC BIOPSY OF BREAST, NEEDLE CORE Right 10/26/2018    NEEDLE LOCALIZATION RIGHT BREAST LUMPECTOMY WITH SENTINEL NODE, BLUE DYE INJECTION WITH  NEOPROBE AND TRIDENT performed by Olman Sheppard MD at [facility] GASTROINTESTINAL ENDOSCOPY  12/7/2015   Social History:  reports that she has quit smoking.  She has never used smokeless tobacco. % 0.5 0.0 - 2.0 %    Neutrophils # 8.72 (H) 1.80 - 7.30 E9/L    Immature Granulocytes # 0.10 E9/L    Lymphocytes # 1.33 (L) 1.50 - 4.00 E9/L    Monocytes # 0.80 0.10 - 0.95 E9/L    Eosinophils # 0.07 0.05 - 0.50 E9/L    Basophils # 0.05 0.00 - 0.20 X1/O   Basic Metabolic Panel   Result Value Ref Range    Sodium 132 132 - 146 mmol/L    Potassium 4.0 3.5 - 5.0 mmol/L    Chloride 98 98 - 107 mmol/L    CO2 18 (L) 22 - 29 mmol/L    Anion Gap 16 7 - 16 mmol/L    Glucose 123 (H) 74 - 99 mg/dL    BUN 35 (H) 8 - 23 mg/dL    CREATININE 1.7 (H) 0.5 - 1.0 mg/dL    GFR Non-African American 29 >=60 mL/min/1.73    GFR African American 35     Calcium 9.1 8.6 - 10.2 mg/dL   Lactic Acid, Plasma   Result Value Ref Range    Lactic Acid 3.4 (H) 0.5 - 2.2 mmol/L   Troponin   Result Value Ref Range    Troponin <0.01 0.00 - 0.03 ng/mL   Protime-INR   Result Value Ref Range    Protime 14.3 (H) 9.3 - 12.4 sec    INR 1.3    APTT   Result Value Ref Range    aPTT 26.3 24.5 - 35.1 sec   Magnesium   Result Value Ref Range    Magnesium 1.5 (L) 1.6 - 2.6 mg/dL   Hepatic Function Panel   Result Value Ref Range    Total Protein 7.4 6.4 - 8.3 g/dL    Alb 4.2 3.5 - 5.2 g/dL    Alkaline Phosphatase 87 35 - 104 U/L    ALT 11 0 - 32 U/L    AST 16 0 - 31 U/L    Total Bilirubin 1.0 0.0 - 1.2 mg/dL    Bilirubin, Direct 0.3 0.0 - 0.3 mg/dL    Bilirubin, Indirect 0.7 0.0 - 1.0 mg/dL   Brain Natriuretic Peptide   Result Value Ref Range    Pro-BNP 5,809 (H) 0 - 450 pg/mL   Sedimentation Rate   Result Value Ref Range    Sed Rate 0 0 - 20 mm/Hr   Serum Drug Screen   Result Value Ref Range    Ethanol Lvl <10 mg/dL    Acetaminophen Level <5.0 (L) 10.0 - 71.7 mcg/mL    Salicylate, Serum <9.1 0.0 - 30.0 mg/dL   TSH without Reflex   Result Value Ref Range    TSH 8.200 (H) 0.270 - 4.200 uIU/mL   Blood Gas, Arterial   Result Value Ref Range    Date Analyzed [id number]     Time Analyzed 2464     Source: Blood Arterial     pH, Blood Gas 7.345 (L) 7.350 - 7.450    PCO2 26.4 (L) 35.0 - 45.0 mmHg    PO2 65.6 60.0 - 100.0 mmHg    HCO3 14.1 (L) 22.0 - 26.0 mmol/L    B.E. -10.0 (L) -3.0 - 3.0 mmol/L    O2 Sat 90.5 (L) 92.0 - 98.5 %    O2Hb 88.8 (L) 94.0 - 97.0 %    COHb 1.9 (H) 0.0 - 1.5 %    MetHb 0.0 0.0 - 1.5 %    O2 Content 15.9 mL/dL    HHb 9.3 (H) 0.0 - 5.0 %    tHb (est) 12.7 11.5 - 16.5 g/dL    Mode NC- 4 L     Date Of Collection      Time Collected      Pt Temp 37.0 C     ID [id number]     Lab [id number]     Critical(s) Notified . No Critical Values    EKG 12 Lead   Result Value Ref Range    Ventricular Rate 79 BPM    Atrial Rate 79 BPM    P-R Interval 178 ms    QRS Duration 76 ms    Q-T Interval 400 ms    QTc Calculation (Bazett) 458 ms    P Axis 79 degrees    R Axis 100 degrees    T Axis 83 degrees   TYPE AND SCREEN   Result Value Ref Range    ABO/Rh A POS     Antibody Screen NEG      EKG: normal sinus rhythm, occasional PVC noted, unifocal, HR 79.    Imaging: All Radiology results interpreted by Radiologist unless otherwise noted. CT Head WO Contrast   Final Result      No evidence of acute intracranial hemorrhage or edema. CT Cervical Spine WO Contrast   Final Result   No evidence of fracture or dislocation of cervical spine.                               XR CHEST PORTABLE   Final Result      Findings suggest early congestive heart failure                       ED Course / Medical Decision Making     Medications   cefepime (MAXIPIME) 2 g IVPB extended (mini-bag) (2 g Intravenous New Bag 4/24/19 1819)   magnesium sulfate 2 g in 50 mL IVPB premix (2 g Intravenous New Bag 4/24/19 1846)   0.9 % sodium chloride bolus (0 mLs Intravenous Stopped 4/24/19 1745)   acetaminophen (TYLENOL) tablet 1,000 mg (1,000 mg Oral Given 4/24/19 1818)   ipratropium-albuterol (DUONEB) nebulizer solution 1 ampule (1 ampule Inhalation Given 4/24/19 1813)   0.9 % sodium chloride infusion ( Intravenous Stopped 4/24/19 1840)   hydrocortisone sodium succinate PF (SOLU-CORTEF) injection 100 mg

## 2019-04-24 NOTE — ED NOTES
Bed:  [place] [address]  Expected date:   Expected time:   Means of arrival:   Comments:  Clean and ready      Rachid Wagner  04/24/19 5680

## 2019-04-25 VITALS
HEART RATE: 92 BPM | RESPIRATION RATE: 20 BRPM | WEIGHT: 142.3 LBS | DIASTOLIC BLOOD PRESSURE: 64 MMHG | OXYGEN SATURATION: 93 % | SYSTOLIC BLOOD PRESSURE: 114 MMHG | BODY MASS INDEX: 27.94 KG/M2 | TEMPERATURE: 97.3 F | HEIGHT: 60 IN

## 2019-04-25 LAB
ANION GAP SERPL CALCULATED.3IONS-SCNC: 13 MMOL/L (ref 7–16)
BUN BLDV-MCNC: 30 MG/DL (ref 8–23)
CALCIUM SERPL-MCNC: 8.8 MG/DL (ref 8.6–10.2)
CHLORIDE BLD-SCNC: 102 MMOL/L (ref 98–107)
CO2: 16 MMOL/L (ref 22–29)
CREAT SERPL-MCNC: 1.3 MG/DL (ref 0.5–1)
GFR AFRICAN AMERICAN: 47
GFR NON-AFRICAN AMERICAN: 39 ML/MIN/1.73
GLUCOSE BLD-MCNC: 162 MG/DL (ref 74–99)
HCT VFR BLD CALC: 37.3 % (ref 34–48)
HEMOGLOBIN: 11.4 G/DL (ref 11.5–15.5)
MCH RBC QN AUTO: 26.5 PG (ref 26–35)
MCHC RBC AUTO-ENTMCNC: 30.6 % (ref 32–34.5)
MCV RBC AUTO: 86.7 FL (ref 80–99.9)
PDW BLD-RTO: 19 FL (ref 11.5–15)
PLATELET # BLD: 165 E9/L (ref 130–450)
PMV BLD AUTO: 9.2 FL (ref 7–12)
POTASSIUM SERPL-SCNC: 4 MMOL/L (ref 3.5–5)
RBC # BLD: 4.3 E12/L (ref 3.5–5.5)
SODIUM BLD-SCNC: 131 MMOL/L (ref 132–146)
WBC # BLD: 9.1 E9/L (ref 4.5–11.5)

## 2019-04-25 PROCEDURE — 80048 BASIC METABOLIC PNL TOTAL CA: CPT

## 2019-04-25 PROCEDURE — 2580000003 HC RX 258: Performed by: INTERNAL MEDICINE

## 2019-04-25 PROCEDURE — 6370000000 HC RX 637 (ALT 250 FOR IP): Performed by: INTERNAL MEDICINE

## 2019-04-25 PROCEDURE — 2700000000 HC OXYGEN THERAPY PER DAY

## 2019-04-25 PROCEDURE — 85027 COMPLETE CBC AUTOMATED: CPT

## 2019-04-25 PROCEDURE — 6360000002 HC RX W HCPCS: Performed by: NURSE PRACTITIONER

## 2019-04-25 PROCEDURE — 36415 COLL VENOUS BLD VENIPUNCTURE: CPT

## 2019-04-25 RX ADMIN — ENOXAPARIN SODIUM 30 MG: 30 INJECTION SUBCUTANEOUS at 08:59

## 2019-04-25 RX ADMIN — HYDROCHLOROTHIAZIDE 12.5 MG: 12.5 TABLET ORAL at 09:00

## 2019-04-25 RX ADMIN — PRAVASTATIN SODIUM 80 MG: 20 TABLET ORAL at 09:00

## 2019-04-25 RX ADMIN — PANTOPRAZOLE SODIUM 40 MG: 40 TABLET, DELAYED RELEASE ORAL at 09:00

## 2019-04-25 RX ADMIN — CITALOPRAM HYDROBROMIDE 20 MG: 20 TABLET ORAL at 09:00

## 2019-04-25 RX ADMIN — AMITRIPTYLINE HYDROCHLORIDE 10 MG: 10 TABLET, FILM COATED ORAL at 08:59

## 2019-04-25 RX ADMIN — Medication 10 ML: at 09:00

## 2019-04-25 RX ADMIN — LOSARTAN POTASSIUM 50 MG: 50 TABLET, FILM COATED ORAL at 08:59

## 2019-04-25 RX ADMIN — LEVOTHYROXINE SODIUM 75 MCG: 75 TABLET ORAL at 06:03

## 2019-04-25 RX ADMIN — POTASSIUM CHLORIDE 20 MEQ: 20 TABLET, EXTENDED RELEASE ORAL at 09:00

## 2019-04-25 ASSESSMENT — PAIN SCALES - GENERAL
PAINLEVEL_OUTOF10: 0
PAINLEVEL_OUTOF10: 0

## 2019-04-25 NOTE — PROGRESS NOTES
Pulse ox 97% on 3-4L nc at rest. Ambulated pt in yee. Pulse ox did drop to 82% on 4L nc w/ ambulation, but quickly recovered w/ rest on 5L nc. Ambulated pt back to room, pulse ox remained 91-93% on 5L nc w/ ambulation. Pt states she does wear 4L o2 at home and she occasionally turns it up to 5-6 w/ ambulation when sob. Dr. Jared Pham notified on above.

## 2019-04-25 NOTE — H&P
History and Physical  Internal Medicine  Vale Bundy MD    CHIEF COMPLAINT:  syncope      HISTORY OF PRESENT ILLNESS:      The patient is a 81 y.o. female patient of Dr Kenneth Newman  who presents with as per ER -  Aldair Neha is a 81 y.o. old female who has a past medical history of:   Past Medical History        Past Medical History:   Diagnosis Date    Anemia      Colon cancer ([address] 11/15    Hypertension      On home oxygen therapy       3L PRN     Pulmonary fibrosis ([address]      Thyroid disease         presents to the emergency department by private vehicle, for a fall which occured 1 hour(s) prior to arrival. She was reportedly at home and said she took a muscle relaxer, turned off home O2 to conserve it, felt dizzy and woke up on the floor . The patients tetanus status is up to date. Since onset the symptoms have been mild in degree.   Her pain is aggraveated by palpation and relieved by rest.  She denies any neck pain, chest pain, abdominal pain or extremity injury.                 Past Medical History:   Diagnosis Date    Anemia     Colon cancer ([address] 11/15    Hypertension     On home oxygen therapy     3L PRN     Pulmonary fibrosis ([address]     Thyroid disease            Past Surgical History:   Procedure Laterality Date    APPENDECTOMY      BREAST LUMPECTOMY Right 10/26/2018    with Dr. Mayra Chong, COLON, DIAGNOSTIC      HEMICOLECTOMY Right 11/12/2015    lysis of adhesions    HYSTERECTOMY      OTHER SURGICAL HISTORY Left 12/7/2015    insertion of mediport left    OK BIOPSY OF BREAST, NEEDLE CORE Right 10/26/2018    NEEDLE LOCALIZATION RIGHT BREAST LUMPECTOMY WITH SENTINEL NODE, BLUE DYE INJECTION WITH  Belen Lopez AND TRIDENT performed by Alicja Ramno MD at [facility] GASTROINTESTINAL ENDOSCOPY  12/7/2015       Medications Prior to Admission: Medications Prior to Admission: levothyroxine (SYNTHROID) 75 MCG tablet, Take 75 mcg by mouth Daily  omeprazole (PRILOSEC) 20 MG capsule, Take 20 mg by mouth daily Instructed to take am of procedure  amitriptyline (ELAVIL) 10 MG tablet, Take 10 mg by mouth daily   pravastatin (PRAVACHOL) 80 MG tablet, Take 80 mg by mouth daily  losartan-hydrochlorothiazide (HYZAAR) 50-12.5 MG per tablet, Take 1 tablet by mouth daily  potassium chloride SA (KLOR-CON M20) 20 MEQ tablet, Take 20 mEq by mouth daily  citalopram (CELEXA) 20 MG tablet, Take 20 mg by mouth daily Instructed to take am of procedure  OXYGEN, Inhale into the lungs as needed   loperamide (IMODIUM) 2 MG capsule, Take 2 mg by mouth 4 times daily as needed for Diarrhea    Allergies:    Penicillins and Aspirin    Social History:    reports that she has quit smoking. She has never used smokeless tobacco. She reports that she does not drink alcohol or use drugs. Family History:   family history is not on file. REVIEW OF SYSTEMS:  As above in the HPI, otherwise negative    Vital Signs:  /67   Pulse 95   Temp 97.6 °F (36.4 °C) (Oral)   Resp 18   Ht 5' (1.524 m)   Wt 142 lb 4.8 oz (64.5 kg)   SpO2 92%   BMI 27.79 kg/m²     Physical Exam:    General appearance: alert, appears stated age and cooperative  Head: Normocephalic, without obvious abnormality, atraumatic  Eyes: conjunctivae/corneas clear. PERRL, EOM's intact. Fundi benign.   Throat: lips, mucosa, and tongue normal; teeth and gums normal  Neck: no adenopathy, no carotid bruit, no JVD, supple, symmetrical, trachea midline and thyroid not enlarged, symmetric, no tenderness/mass/nodules  Back: negative  Lungs: clear to auscultation bilaterally  Chest wall: no tenderness  Heart: regular rate and rhythm  Abdomen: soft, non-tender; bowel sounds normal; no masses,  no organomegaly  Extremities: extremities normal, atraumatic, no cyanosis or edema  Pulses: 2+ and symmetric  Skin: Skin color, texture, turgor normal. No rashes or lesions  Lymph nodes: Cervical, supraclavicular, and axillary nodes normal.  Neurologic: Grossly normal  Rectal: deferred    LABS:    Recent Results (from the past 24 hour(s))   EKG 12 Lead    Collection Time: 04/24/19  4:35 PM   Result Value Ref Range    Ventricular Rate 79 BPM    Atrial Rate 79 BPM    P-R Interval 178 ms    QRS Duration 76 ms    Q-T Interval 400 ms    QTc Calculation (Bazett) 458 ms    P Axis 79 degrees    R Axis 100 degrees    T Axis 83 degrees   CBC Auto Differential    Collection Time: 04/24/19  5:26 PM   Result Value Ref Range    WBC 11.1 4.5 - 11.5 E9/L    RBC 4.66 3.50 - 5.50 E12/L    Hemoglobin 12.7 11.5 - 15.5 g/dL    Hematocrit 41.1 34.0 - 48.0 %    MCV 88.2 80.0 - 99.9 fL    MCH 27.3 26.0 - 35.0 pg    MCHC 30.9 (L) 32.0 - 34.5 %    RDW 19.6 (H) 11.5 - 15.0 fL    Platelets 877 193 - 629 E9/L    MPV 9.8 7.0 - 12.0 fL    Neutrophils % 78.8 43.0 - 80.0 %    Immature Granulocytes % 0.9 0.0 - 5.0 %    Lymphocytes % 12.0 (L) 20.0 - 42.0 %    Monocytes % 7.2 2.0 - 12.0 %    Eosinophils % 0.6 0.0 - 6.0 %    Basophils % 0.5 0.0 - 2.0 %    Neutrophils # 8.72 (H) 1.80 - 7.30 E9/L    Immature Granulocytes # 0.10 E9/L    Lymphocytes # 1.33 (L) 1.50 - 4.00 E9/L    Monocytes # 0.80 0.10 - 0.95 E9/L    Eosinophils # 0.07 0.05 - 0.50 E9/L    Basophils # 0.05 0.00 - 0.20 Z7/G   Basic Metabolic Panel    Collection Time: 04/24/19  5:26 PM   Result Value Ref Range    Sodium 132 132 - 146 mmol/L    Potassium 4.0 3.5 - 5.0 mmol/L    Chloride 98 98 - 107 mmol/L    CO2 18 (L) 22 - 29 mmol/L    Anion Gap 16 7 - 16 mmol/L    Glucose 123 (H) 74 - 99 mg/dL    BUN 35 (H) 8 - 23 mg/dL    CREATININE 1.7 (H) 0.5 - 1.0 mg/dL    GFR Non-African American 29 >=60 mL/min/1.73    GFR African American 35     Calcium 9.1 8.6 - 10.2 mg/dL   Lactic Acid, Plasma    Collection Time: 04/24/19  5:26 PM   Result Value Ref Range    Lactic Acid 3.4 (H) 0.5 - 2.2 mmol/L   Troponin    Collection Time: 04/24/19 5:26 PM   Result Value Ref Range    Troponin <0.01 0.00 - 0.03 ng/mL   Protime-INR    Collection Time: 04/24/19  5:26 PM   Result Value Ref Range    Protime 14.3 (H) 9.3 - 12.4 sec    INR 1.3    APTT    Collection Time: 04/24/19  5:26 PM   Result Value Ref Range    aPTT 26.3 24.5 - 35.1 sec   Magnesium    Collection Time: 04/24/19  5:26 PM   Result Value Ref Range    Magnesium 1.5 (L) 1.6 - 2.6 mg/dL   Hepatic Function Panel    Collection Time: 04/24/19  5:26 PM   Result Value Ref Range    Total Protein 7.4 6.4 - 8.3 g/dL    Alb 4.2 3.5 - 5.2 g/dL    Alkaline Phosphatase 87 35 - 104 U/L    ALT 11 0 - 32 U/L    AST 16 0 - 31 U/L    Total Bilirubin 1.0 0.0 - 1.2 mg/dL    Bilirubin, Direct 0.3 0.0 - 0.3 mg/dL    Bilirubin, Indirect 0.7 0.0 - 1.0 mg/dL   Brain Natriuretic Peptide    Collection Time: 04/24/19  5:26 PM   Result Value Ref Range    Pro-BNP 5,809 (H) 0 - 450 pg/mL   Sedimentation Rate    Collection Time: 04/24/19  5:26 PM   Result Value Ref Range    Sed Rate 0 0 - 20 mm/Hr   TSH without Reflex    Collection Time: 04/24/19  5:26 PM   Result Value Ref Range    TSH 8.200 (H) 0.270 - 4.200 uIU/mL   C-reactive protein    Collection Time: 04/24/19  5:46 PM   Result Value Ref Range    CRP 0.2 0.0 - 0.4 mg/dL   Serum Drug Screen    Collection Time: 04/24/19  5:46 PM   Result Value Ref Range    Ethanol Lvl <10 mg/dL    Acetaminophen Level <5.0 (L) 10.0 - 73.1 mcg/mL    Salicylate, Serum <2.5 0.0 - 30.0 mg/dL    TCA Scrn NEGATIVE Cutoff:300 ng/mL   Cortisol Total    Collection Time: 04/24/19  5:46 PM   Result Value Ref Range    Cortisol 18.02 2.68 - 18.40 mcg/dL   TYPE AND SCREEN    Collection Time: 04/24/19  5:55 PM   Result Value Ref Range    ABO/Rh A POS     Antibody Screen NEG    Blood Gas, Arterial    Collection Time: 04/24/19  7:14 PM   Result Value Ref Range    Date Analyzed [id number]     Time Analyzed 1914     Source: Blood Arterial     pH, Blood Gas 7.345 (L) 7.350 - 7.450    PCO2 26.4 (L) 35.0 - 45.0 mmHg PO2 65.6 60.0 - 100.0 mmHg    HCO3 14.1 (L) 22.0 - 26.0 mmol/L    B.E. -10.0 (L) -3.0 - 3.0 mmol/L    O2 Sat 90.5 (L) 92.0 - 98.5 %    O2Hb 88.8 (L) 94.0 - 97.0 %    COHb 1.9 (H) 0.0 - 1.5 %    MetHb 0.0 0.0 - 1.5 %    O2 Content 15.9 mL/dL    HHb 9.3 (H) 0.0 - 5.0 %    tHb (est) 12.7 11.5 - 16.5 g/dL    Mode NC- 4 L     Date Of Collection      Time Collected      Pt Temp 37.0 C     ID [id number]     Lab [id number]     Critical(s) Notified .  No Critical Values    Lactic Acid, Plasma    Collection Time: 04/24/19  7:54 PM   Result Value Ref Range    Lactic Acid 2.2 0.5 - 2.2 mmol/L   Urinalysis    Collection Time: 04/24/19  8:02 PM   Result Value Ref Range    Color, UA Yellow Straw/Yellow    Clarity, UA Clear Clear    Glucose, Ur Negative Negative mg/dL    Bilirubin Urine Negative Negative    Ketones, Urine Negative Negative mg/dL    Specific Gravity, UA 1.015 1.005 - 1.030    Blood, Urine Negative Negative    pH, UA 5.5 5.0 - 9.0    Protein,  (A) Negative mg/dL    Urobilinogen, Urine 0.2 <2.0 E.U./dL    Nitrite, Urine POSITIVE (A) Negative    Leukocyte Esterase, Urine Negative Negative   Urine Drug Screen    Collection Time: 04/24/19  8:02 PM   Result Value Ref Range    Amphetamine Screen, Urine NOT DETECTED Negative <1000 ng/mL    Barbiturate Screen, Ur NOT DETECTED Negative < 200 ng/mL    Benzodiazepine Screen, Urine NOT DETECTED Negative < 200 ng/mL    Cannabinoid Scrn, Ur NOT DETECTED Negative < 50ng/mL    Cocaine Metabolite Screen, Urine NOT DETECTED Negative < 300 ng/mL    Opiate Scrn, Ur NOT DETECTED Negative < 300ng/mL    PCP Screen, Urine NOT DETECTED Negative < 25 ng/mL    Methadone Screen, Urine NOT DETECTED Negative <300 ng/mL    Propoxyphene Scrn, Ur NOT DETECTED Negative <300 ng/mL   Microscopic Urinalysis    Collection Time: 04/24/19  8:02 PM   Result Value Ref Range    Casts MANY /LPF    CASTS 2 MODERATE /LPF    WBC, UA 2-5 0 - 5 /HPF    RBC, UA 0-1 0 - 2 /HPF    Epi Cells FEW /HPF    Renal Epithelial, Urine MODERATE /HPF    Bacteria, UA MODERATE (A) /HPF   D-dimer, quantitative    Collection Time: 19 10:30 PM   Result Value Ref Range    D-Dimer, Quant 396 ng/mL DDU   CBC    Collection Time: 19  4:27 AM   Result Value Ref Range    WBC 9.1 4.5 - 11.5 E9/L    RBC 4.30 3.50 - 5.50 E12/L    Hemoglobin 11.4 (L) 11.5 - 15.5 g/dL    Hematocrit 37.3 34.0 - 48.0 %    MCV 86.7 80.0 - 99.9 fL    MCH 26.5 26.0 - 35.0 pg    MCHC 30.6 (L) 32.0 - 34.5 %    RDW 19.0 (H) 11.5 - 15.0 fL    Platelets 093 800 - 147 E9/L    MPV 9.2 7.0 - 12.0 fL   Basic metabolic panel    Collection Time: 19  4:27 AM   Result Value Ref Range    Sodium 131 (L) 132 - 146 mmol/L    Potassium 4.0 3.5 - 5.0 mmol/L    Chloride 102 98 - 107 mmol/L    CO2 16 (L) 22 - 29 mmol/L    Anion Gap 13 7 - 16 mmol/L    Glucose 162 (H) 74 - 99 mg/dL    BUN 30 (H) 8 - 23 mg/dL    CREATININE 1.3 (H) 0.5 - 1.0 mg/dL    GFR Non-African American 39 >=60 mL/min/1.73    GFR African American 47     Calcium 8.8 8.6 - 10.2 mg/dL       Radiology:     Chest  Xray:  Results for orders placed during the hospital encounter of 18   XR CHEST STANDARD (2 VW)    Narrative EXAMINATION:  CHEST RADIOGRAPH (2 VIEW FRONTAL & LATERAL)  Patient MRN:  [id number]  : 1935  Age: 81 years  Gender: Female  Order Date:  2018 5:00 PM  EXAM: XR CHEST (2 VW)  NUMBER OF IMAGES \ views:  2  INDICATION:  shortness of breath   shortness of breath  COMPARISON: None    RESULT:     Lines, tubes, and devices:  Left-sided portacatheter, alignment  appears to terminate near the confluence of the brachiocephalic veins. Lungs and pleura: Increased reticular markings bilaterally with  predominance in the lung bases suggest either pulmonary fibrosis or  edema. No large pleural effusion. No pneumothorax. Cardiomediastinal silhouette:  Enlarged cardiac mediastinal  silhouette. Other:  Comminuted right humeral neck fracture.          Impression Comment fibrosis or edema. Cardiomegaly. Comminuted right humeral neck fracture. Results for orders placed during the hospital encounter of 19   XR CHEST PORTABLE    Narrative Patient MRN:  [id number]  : 1935  Age: 81 years  Gender: Female    Order Date:  2019 5:45 PM    EXAM: XR CHEST PORTABLE    NUMBER OF IMAGES:  1     INDICATION:  low blood pressure, weakness   low blood pressure, weakness     COMPARISON: 2018. FINDINGS:  Lung markings are exaggerated today by the lordotic projection. However, the heart remains borderline enlarged with left ventricular  configuration. Mild to moderate pulmonary vascular congestive and  early interstitial edematous changes are noted. [id number] is again  seen. There is no evidence of pleural effusion. Old comminuted fracture of the right proximal humerus is again noted. Impression Findings suggest early congestive heart failure                 Other:  none      ASSESSMENT:      Active Problems:    Hypotension  Resolved Problems:    * No resolved hospital problems.  *      PLAN:    Syncope appears more vagal than anything - workup negative - will anbulate in yee if does well discharge later today    Mamta Olivares  6:51 AM  2019

## 2019-04-26 LAB
EKG ATRIAL RATE: 79 BPM
EKG P AXIS: 79 DEGREES
EKG P-R INTERVAL: 178 MS
EKG Q-T INTERVAL: 400 MS
EKG QRS DURATION: 76 MS
EKG QTC CALCULATION (BAZETT): 458 MS
EKG R AXIS: 100 DEGREES
EKG T AXIS: 83 DEGREES
EKG VENTRICULAR RATE: 79 BPM

## 2019-04-27 LAB — URINE CULTURE, ROUTINE: NORMAL

## 2019-04-29 LAB
BLOOD CULTURE, ROUTINE: NORMAL
CULTURE, BLOOD 2: NORMAL

## 2019-06-03 ENCOUNTER — TELEPHONE (OUTPATIENT)
Dept: PALLATIVE CARE | Age: 84
End: 2019-06-03

## 2019-06-03 NOTE — TELEPHONE ENCOUNTER
Received referral from . Called patient LM with palliative care role and also left possible appointment date of 6/27/19 at 1:00pm. Advised to call back to schedule or with any questions. Electronically signed by Kalli Rhodes on 6/3/2019 at 3:43 PM

## 2019-06-27 ENCOUNTER — CLINICAL DOCUMENTATION (OUTPATIENT)
Dept: PALLATIVE CARE | Age: 84
End: 2019-06-27

## 2019-06-27 ENCOUNTER — OFFICE VISIT (OUTPATIENT)
Dept: PALLATIVE CARE | Age: 84
End: 2019-06-27
Payer: MEDICARE

## 2019-06-27 VITALS — HEART RATE: 66 BPM | SYSTOLIC BLOOD PRESSURE: 108 MMHG | RESPIRATION RATE: 14 BRPM | DIASTOLIC BLOOD PRESSURE: 62 MMHG

## 2019-06-27 DIAGNOSIS — R06.09 DYSPNEA ON EXERTION: ICD-10-CM

## 2019-06-27 DIAGNOSIS — I50.30 DIASTOLIC HEART FAILURE, UNSPECIFIED HF CHRONICITY (HCC): ICD-10-CM

## 2019-06-27 DIAGNOSIS — Z71.89 COUNSELING REGARDING ADVANCE CARE PLANNING AND GOALS OF CARE: ICD-10-CM

## 2019-06-27 DIAGNOSIS — J84.10 PULMONARY FIBROSIS (HCC): ICD-10-CM

## 2019-06-27 DIAGNOSIS — Z51.5 PALLIATIVE CARE BY SPECIALIST: Primary | ICD-10-CM

## 2019-06-27 DIAGNOSIS — Z85.3 HISTORY OF RIGHT BREAST CANCER: ICD-10-CM

## 2019-06-27 DIAGNOSIS — M79.89 SWELLING OF BOTH LOWER EXTREMITIES: ICD-10-CM

## 2019-06-27 PROCEDURE — 99345 HOME/RES VST NEW HIGH MDM 75: CPT | Performed by: NURSE PRACTITIONER

## 2019-06-27 RX ORDER — BENZONATATE 100 MG/1
100 CAPSULE ORAL 3 TIMES DAILY PRN
COMMUNITY

## 2019-06-27 RX ORDER — FUROSEMIDE 20 MG/1
20 TABLET ORAL
COMMUNITY

## 2019-06-27 NOTE — PROGRESS NOTES
Palliative Care Department  Palliative Care Initial Consult  Provider: Wilbert MCGRAW-CNP    Referring Provider:  Dr. Trupti Tijerina  PCP:  Dr. Tia Faulkner  Oncology:  Dr. Sandip Espino  Surgeon: Dr. Aide Choudhury    Location of Service:   Mrs. Abbie Bishop home    Reason for Consult:  [x]  Code status Discussion  [x]  Assist with goals of care  [x]  Psychosocial support  [x]  Symptom Management  []  Advanced Care Planning    Chief Complaint: Laz Carmona is a 81 y.o. 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 Aide Choudhury   -  Started on Herceptin    -  Managed by Dr. Leni Walden seen by Dr. Guillermo Rashid   -  Herceptin recently stopped about 2 weeks ago    Colon Cancer:   -  S/p resection in 2015 with Dr. Janneth Schneider    Lower Extremity Edema:    -  Started about 1 month ago   -  Started on Lasix 20 mg daily by PCP, she reports taking 2 tabs daily, instructed to only take as prescribed   -  Losartan stopped    -  Herceptin stopped   -  Echo performed   -  ?CHF? Fatigue:    -  Worsened in the past 2 months   -  Encouraged to continued current activity and increase as tolerated.     Palliative Care Encounter:      - Goals of care: live longer, improve or maintain function/quality of life, remain at home, preserve independence/autonomy/control and continue current management     - Code Status: limited code- no CPR, No Intubation, No Medications, No Cardioversion    Prognosis: Guarded      -We will make referral to the Medicare care choice program.    Follow Up:  4 weeks. They were encouraged to call with any questions, concerns, needs, or changes in symptoms. Subjective:     HPI:  Geoff Wolf is a 81 y.o. 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 Richardson Burciaga, on herceptin, which was recently started, also with history of chronic respiratory failure, pulmonary fibrosis/COPD, followed by Dr. Abdullahi Her. She was referred to [address] [place] [address] to assist with goals of care and symptom management. Subjective/Events/Discussions:  6/27/19  Mrs. Vicky Chauhan is seen in her home today, no family present. I am accompanied by the palliative medicine license . Introduced palliative medicine, discussed the role of medicine could play in her care. This includes discussion regarding her life limiting illness, psychosocial support, goals of care, and symptom management. Mrs. Vicky Chauhan is alert, oriented, and able to voice her needs and concerns well. Currently she complains of shortness of breath with exertion, which improves with rest.  This is associated with a dry nonproductive cough, and has been present for the past 2 months. Today she also complains of bilateral lower extremity swelling, which began approximately 1 month ago, and she was prescribed Lasix by her PCP. She was recently seen by her oncology team, who stopped her to Herceptin, ordered a chest x-ray, as well as an echocardiogram.  Goals of care, her goal is to remain at home, and preserve her independence, as well as live longer with a good quality of life. Regarding resuscitation, she expresses that she would not wish to be resuscitated, and also would not wish to be intubated/mechanically ventilated.   She currently resides alone, but does receive assistance within her home from her sister-in-law, as well as a niece. Resources were discussed, and referral to the Medicare care choice program will be made, as well as contact with the local [facility] agency on aging. Options of care were discussed, plan is as detailed above. She is encouraged to maintain her current level activity, as well as to increase her activity as tolerated. She is encouraged to elevate her legs often, and to take her medication as prescribed. Regarding her shortness of breath, she is encouraged to use nonpharmacological strategies as we discussed, and if this worsens, we will further consider the addition of low-dose oral morphine. She is appreciative of the support provided, will plan to follow-up with her in approximately 4 weeks to reassess.     Family Meeting:    Participants:patient    Family meeting was held to discuss:diagnosis, treatment options, goals of care, prior expressed wishes, advanced care planning and symptom management      Referrals to: Medicare care choice program    Pain Assessment   Ratin    Past Medical History:   Diagnosis Date    Anemia     Colon cancer ([facility] 11/15    Hypertension     On home oxygen therapy     3L PRN     Pulmonary fibrosis ([facility]     Thyroid disease        Past Surgical History:   Procedure Laterality Date    APPENDECTOMY      BREAST LUMPECTOMY Right 10/26/2018    with Dr. Vinay Gómez, COLON, DIAGNOSTIC      HEMICOLECTOMY Right 2015    lysis of adhesions    HYSTERECTOMY      OTHER SURGICAL HISTORY Left 2015    insertion of mediport left    OK BIOPSY OF BREAST, NEEDLE CORE Right 10/26/2018    NEEDLE LOCALIZATION RIGHT BREAST LUMPECTOMY WITH SENTINEL NODE, BLUE DYE INJECTION WITH  Equilla Yumiko AND TRIDENT performed by Thony Go MD at [place] [address]. ENDOSCOPY counseling and coordination of care at the bedside regarding goals of care, symptom management, diagnosis and prognosis and see above. Discussed patient and the plan of care with the other IDT members of the Palliative Care Team, and with patient. Thank you for allowing Palliative Medicine to participate in the care of [address]. Note: This report was completed using computerize voiced recognition software. Every effort has been made to ensure accuracy; however, inadvertent computerized transcription errors may be present.

## 2019-06-28 NOTE — PROGRESS NOTES
for her safety. Pt has had home health in the past [facility]). No other in-home care services in place at this time. Pt reports family does assist her and visits with her 2-4 times a week. Sister-in-law provides transportation as pt needs. Pt does drive to some appointments. Offered active listening and support as pt shared some of her life review. We reviewed her goals of care and will continue to follow along in support of pt's needs. Provided pt with [facility] contact information and encouraged her to call with any questions, concerns or barriers to care. Plan:   1. Area Agency on Aging referral  2.  Ongoing support

## 2019-07-02 ENCOUNTER — CLINICAL DOCUMENTATION (OUTPATIENT)
Dept: PALLATIVE CARE | Age: 84
End: 2019-07-02

## 2019-07-03 ENCOUNTER — APPOINTMENT (OUTPATIENT)
Dept: ULTRASOUND IMAGING | Age: 84
DRG: 315 | End: 2019-07-03
Payer: MEDICARE

## 2019-07-03 ENCOUNTER — HOSPITAL ENCOUNTER (INPATIENT)
Age: 84
LOS: 3 days | Discharge: OTHER INSTITUTION | DRG: 315 | End: 2019-07-06
Attending: EMERGENCY MEDICINE | Admitting: INTERNAL MEDICINE
Payer: MEDICARE

## 2019-07-03 DIAGNOSIS — J84.10 PULMONARY FIBROSIS (HCC): ICD-10-CM

## 2019-07-03 DIAGNOSIS — I82.A12 ACUTE DEEP VEIN THROMBOSIS (DVT) OF AXILLARY VEIN OF LEFT UPPER EXTREMITY (HCC): Primary | ICD-10-CM

## 2019-07-03 LAB
ALBUMIN SERPL-MCNC: 3.7 G/DL (ref 3.5–5.2)
ALP BLD-CCNC: 72 U/L (ref 35–104)
ALT SERPL-CCNC: 7 U/L (ref 0–32)
ANION GAP SERPL CALCULATED.3IONS-SCNC: 15 MMOL/L (ref 7–16)
APTT: 44.8 SEC (ref 24.5–35.1)
AST SERPL-CCNC: 13 U/L (ref 0–31)
BASOPHILS ABSOLUTE: 0.07 E9/L (ref 0–0.2)
BASOPHILS RELATIVE PERCENT: 0.8 % (ref 0–2)
BILIRUB SERPL-MCNC: 1.4 MG/DL (ref 0–1.2)
BUN BLDV-MCNC: 36 MG/DL (ref 8–23)
CALCIUM SERPL-MCNC: 8.7 MG/DL (ref 8.6–10.2)
CHLORIDE BLD-SCNC: 103 MMOL/L (ref 98–107)
CO2: 17 MMOL/L (ref 22–29)
CREAT SERPL-MCNC: 1.6 MG/DL (ref 0.5–1)
EKG ATRIAL RATE: 93 BPM
EKG P AXIS: 77 DEGREES
EKG P-R INTERVAL: 170 MS
EKG Q-T INTERVAL: 402 MS
EKG QRS DURATION: 76 MS
EKG QTC CALCULATION (BAZETT): 499 MS
EKG R AXIS: 113 DEGREES
EKG T AXIS: 98 DEGREES
EKG VENTRICULAR RATE: 93 BPM
EOSINOPHILS ABSOLUTE: 0.06 E9/L (ref 0.05–0.5)
EOSINOPHILS RELATIVE PERCENT: 0.7 % (ref 0–6)
GFR AFRICAN AMERICAN: 37
GFR NON-AFRICAN AMERICAN: 31 ML/MIN/1.73
GLUCOSE BLD-MCNC: 101 MG/DL (ref 74–99)
HCT VFR BLD CALC: 40.1 % (ref 34–48)
HEMOGLOBIN: 12.1 G/DL (ref 11.5–15.5)
IMMATURE GRANULOCYTES #: 0.04 E9/L
IMMATURE GRANULOCYTES %: 0.5 % (ref 0–5)
INR BLD: 1.5
LYMPHOCYTES ABSOLUTE: 1.25 E9/L (ref 1.5–4)
LYMPHOCYTES RELATIVE PERCENT: 14.5 % (ref 20–42)
MCH RBC QN AUTO: 25.3 PG (ref 26–35)
MCHC RBC AUTO-ENTMCNC: 30.2 % (ref 32–34.5)
MCV RBC AUTO: 83.9 FL (ref 80–99.9)
MONOCYTES ABSOLUTE: 0.72 E9/L (ref 0.1–0.95)
MONOCYTES RELATIVE PERCENT: 8.4 % (ref 2–12)
NEUTROPHILS ABSOLUTE: 6.48 E9/L (ref 1.8–7.3)
NEUTROPHILS RELATIVE PERCENT: 75.1 % (ref 43–80)
PDW BLD-RTO: 19.3 FL (ref 11.5–15)
PLATELET # BLD: 190 E9/L (ref 130–450)
PMV BLD AUTO: 11 FL (ref 7–12)
POTASSIUM REFLEX MAGNESIUM: 4.7 MMOL/L (ref 3.5–5)
PRO-BNP: 8476 PG/ML (ref 0–450)
PROTHROMBIN TIME: 16.5 SEC (ref 9.3–12.4)
RBC # BLD: 4.78 E12/L (ref 3.5–5.5)
SODIUM BLD-SCNC: 135 MMOL/L (ref 132–146)
TOTAL PROTEIN: 6.7 G/DL (ref 6.4–8.3)
TROPONIN: <0.01 NG/ML (ref 0–0.03)
WBC # BLD: 8.6 E9/L (ref 4.5–11.5)

## 2019-07-03 PROCEDURE — 6360000002 HC RX W HCPCS: Performed by: INTERNAL MEDICINE

## 2019-07-03 PROCEDURE — 83880 ASSAY OF NATRIURETIC PEPTIDE: CPT

## 2019-07-03 PROCEDURE — 6370000000 HC RX 637 (ALT 250 FOR IP): Performed by: INTERNAL MEDICINE

## 2019-07-03 PROCEDURE — 2700000000 HC OXYGEN THERAPY PER DAY

## 2019-07-03 PROCEDURE — 80053 COMPREHEN METABOLIC PANEL: CPT

## 2019-07-03 PROCEDURE — 99223 1ST HOSP IP/OBS HIGH 75: CPT | Performed by: SURGERY

## 2019-07-03 PROCEDURE — 85610 PROTHROMBIN TIME: CPT

## 2019-07-03 PROCEDURE — 93971 EXTREMITY STUDY: CPT

## 2019-07-03 PROCEDURE — 85025 COMPLETE CBC W/AUTO DIFF WBC: CPT

## 2019-07-03 PROCEDURE — 85730 THROMBOPLASTIN TIME PARTIAL: CPT

## 2019-07-03 PROCEDURE — 2060000000 HC ICU INTERMEDIATE R&B

## 2019-07-03 PROCEDURE — 99285 EMERGENCY DEPT VISIT HI MDM: CPT

## 2019-07-03 PROCEDURE — 93970 EXTREMITY STUDY: CPT

## 2019-07-03 PROCEDURE — 2580000003 HC RX 258: Performed by: EMERGENCY MEDICINE

## 2019-07-03 PROCEDURE — 84484 ASSAY OF TROPONIN QUANT: CPT

## 2019-07-03 PROCEDURE — 93005 ELECTROCARDIOGRAM TRACING: CPT | Performed by: EMERGENCY MEDICINE

## 2019-07-03 PROCEDURE — 93010 ELECTROCARDIOGRAM REPORT: CPT | Performed by: INTERNAL MEDICINE

## 2019-07-03 RX ORDER — AMITRIPTYLINE HYDROCHLORIDE 10 MG/1
10 TABLET, FILM COATED ORAL EVERY MORNING
Status: DISCONTINUED | OUTPATIENT
Start: 2019-07-04 | End: 2019-07-06 | Stop reason: HOSPADM

## 2019-07-03 RX ORDER — SODIUM CHLORIDE 0.9 % (FLUSH) 0.9 %
10 SYRINGE (ML) INJECTION EVERY 12 HOURS SCHEDULED
Status: DISCONTINUED | OUTPATIENT
Start: 2019-07-03 | End: 2019-07-06 | Stop reason: HOSPADM

## 2019-07-03 RX ORDER — SODIUM CHLORIDE 0.9 % (FLUSH) 0.9 %
10 SYRINGE (ML) INJECTION PRN
Status: DISCONTINUED | OUTPATIENT
Start: 2019-07-03 | End: 2019-07-06 | Stop reason: HOSPADM

## 2019-07-03 RX ORDER — BENZONATATE 100 MG/1
100 CAPSULE ORAL 3 TIMES DAILY PRN
Status: DISCONTINUED | OUTPATIENT
Start: 2019-07-03 | End: 2019-07-06 | Stop reason: HOSPADM

## 2019-07-03 RX ORDER — FUROSEMIDE 20 MG/1
20 TABLET ORAL
Status: DISCONTINUED | OUTPATIENT
Start: 2019-07-04 | End: 2019-07-06 | Stop reason: HOSPADM

## 2019-07-03 RX ORDER — PANTOPRAZOLE SODIUM 40 MG/1
40 TABLET, DELAYED RELEASE ORAL
Status: DISCONTINUED | OUTPATIENT
Start: 2019-07-04 | End: 2019-07-06 | Stop reason: HOSPADM

## 2019-07-03 RX ORDER — CITALOPRAM 20 MG/1
20 TABLET ORAL EVERY MORNING
Status: DISCONTINUED | OUTPATIENT
Start: 2019-07-04 | End: 2019-07-06 | Stop reason: HOSPADM

## 2019-07-03 RX ORDER — LOPERAMIDE HYDROCHLORIDE 2 MG/1
2 CAPSULE ORAL 4 TIMES DAILY PRN
Status: DISCONTINUED | OUTPATIENT
Start: 2019-07-03 | End: 2019-07-06 | Stop reason: HOSPADM

## 2019-07-03 RX ORDER — ACETAMINOPHEN 325 MG/1
650 TABLET ORAL EVERY 4 HOURS PRN
Status: DISCONTINUED | OUTPATIENT
Start: 2019-07-03 | End: 2019-07-03 | Stop reason: SDUPTHER

## 2019-07-03 RX ORDER — POTASSIUM CHLORIDE 20 MEQ/1
20 TABLET, EXTENDED RELEASE ORAL EVERY MORNING
Status: DISCONTINUED | OUTPATIENT
Start: 2019-07-04 | End: 2019-07-06 | Stop reason: HOSPADM

## 2019-07-03 RX ORDER — ACETAMINOPHEN 325 MG/1
650 TABLET ORAL EVERY 4 HOURS PRN
Status: DISCONTINUED | OUTPATIENT
Start: 2019-07-03 | End: 2019-07-06 | Stop reason: HOSPADM

## 2019-07-03 RX ORDER — PRAVASTATIN SODIUM 20 MG
80 TABLET ORAL EVERY MORNING
Status: DISCONTINUED | OUTPATIENT
Start: 2019-07-04 | End: 2019-07-06 | Stop reason: HOSPADM

## 2019-07-03 RX ORDER — LEVOTHYROXINE SODIUM 0.07 MG/1
75 TABLET ORAL EVERY MORNING
Status: DISCONTINUED | OUTPATIENT
Start: 2019-07-04 | End: 2019-07-04

## 2019-07-03 RX ADMIN — ACETAMINOPHEN 650 MG: 325 TABLET ORAL at 20:11

## 2019-07-03 RX ADMIN — Medication 500 UNITS: at 20:12

## 2019-07-03 RX ADMIN — BENZONATATE 100 MG: 100 CAPSULE ORAL at 20:11

## 2019-07-03 RX ADMIN — ENOXAPARIN SODIUM 70 MG: 80 INJECTION SUBCUTANEOUS at 15:42

## 2019-07-03 RX ADMIN — Medication 10 ML: at 20:12

## 2019-07-03 ASSESSMENT — ENCOUNTER SYMPTOMS
VOMITING: 0
NAUSEA: 0
EYES NEGATIVE: 1
SORE THROAT: 0
SHORTNESS OF BREATH: 1

## 2019-07-03 ASSESSMENT — PAIN DESCRIPTION - PAIN TYPE
TYPE: ACUTE PAIN
TYPE: ACUTE PAIN

## 2019-07-03 ASSESSMENT — PAIN SCALES - GENERAL
PAINLEVEL_OUTOF10: 0
PAINLEVEL_OUTOF10: 3
PAINLEVEL_OUTOF10: 0
PAINLEVEL_OUTOF10: 5

## 2019-07-03 ASSESSMENT — PAIN - FUNCTIONAL ASSESSMENT
PAIN_FUNCTIONAL_ASSESSMENT: PREVENTS OR INTERFERES SOME ACTIVE ACTIVITIES AND ADLS
PAIN_FUNCTIONAL_ASSESSMENT: PREVENTS OR INTERFERES SOME ACTIVE ACTIVITIES AND ADLS

## 2019-07-03 ASSESSMENT — PAIN DESCRIPTION - ORIENTATION
ORIENTATION: RIGHT;LEFT;LOWER
ORIENTATION: RIGHT;LEFT;LOWER

## 2019-07-03 ASSESSMENT — PAIN DESCRIPTION - FREQUENCY
FREQUENCY: INTERMITTENT
FREQUENCY: INTERMITTENT

## 2019-07-03 ASSESSMENT — PAIN DESCRIPTION - LOCATION
LOCATION: LEG
LOCATION: LEG

## 2019-07-03 ASSESSMENT — PAIN DESCRIPTION - ONSET
ONSET: ON-GOING
ONSET: SUDDEN

## 2019-07-03 ASSESSMENT — PAIN DESCRIPTION - DESCRIPTORS
DESCRIPTORS: CRAMPING
DESCRIPTORS: CRAMPING

## 2019-07-03 ASSESSMENT — PAIN DESCRIPTION - PROGRESSION
CLINICAL_PROGRESSION: GRADUALLY WORSENING
CLINICAL_PROGRESSION: GRADUALLY WORSENING

## 2019-07-03 NOTE — CONSULTS
GOITER SURGERY    TONSILLECTOMY      UPPER GASTROINTESTINAL ENDOSCOPY  12/7/2015       Current Medications:      sodium chloride flush, benzonatate, loperamide, acetaminophen, heparin flush (100 units/mL)    sodium chloride flush  10 mL Intravenous 2 times per day    [START ON 7/4/2019] amitriptyline  10 mg Oral QAM    [START ON 7/4/2019] citalopram  20 mg Oral QAM    [START ON 7/4/2019] furosemide  20 mg Oral QAM AC    [START ON 7/4/2019] levothyroxine  75 mcg Oral QAM    [START ON 7/4/2019] pantoprazole  40 mg Oral QAM AC    [START ON 7/4/2019] potassium chloride  20 mEq Oral QAM    [START ON 7/4/2019] pravastatin  80 mg Oral QAM    [START ON 7/4/2019] enoxaparin  1 mg/kg Subcutaneous Q24H        Allergies:  Penicillins and Aspirin    Social History     Socioeconomic History    Marital status:       Spouse name: Not on file    Number of children: Not on file    Years of education: Not on file    Highest education level: Not on file   Occupational History    Not on file   Social Needs    Financial resource strain: Not on file    Food insecurity:     Worry: Not on file     Inability: Not on file    Transportation needs:     Medical: Not on file     Non-medical: Not on file   Tobacco Use    Smoking status: Former Smoker    Smokeless tobacco: Never Used    Tobacco comment: QUIT 25 YEARS AGO   Substance and Sexual Activity    Alcohol use: No    Drug use: No    Sexual activity: Not Currently   Lifestyle    Physical activity:     Days per week: Not on file     Minutes per session: Not on file    Stress: Not on file   Relationships    Social connections:     Talks on phone: Not on file     Gets together: Not on file     Attends Caodaism service: Not on file     Active member of club or organization: Not on file     Attends meetings of clubs or organizations: Not on file     Relationship status: Not on file    Intimate partner violence:     Fear of current or ex partner: Not on file Emotionally abused: Not on file     Physically abused: Not on file     Forced sexual activity: Not on file   Other Topics Concern    Not on file   Social History Narrative    Not on file        History reviewed. No pertinent family history.     REVIEW OF SYSTEMS:      Eyes:      Blurred vision:  No [x]/Yes []               Diplopia:   No [x]/Yes []               Vision loss:       No [x]/Yes []   Ears, nose, throat:             Hearing loss:    No [x]/Yes []      Vertigo:   No [x]/Yes []                       Swallowing problem:  No [x]/Yes []               Nose bleeds:   No [x]/Yes []      Voice hoarseness:  No [x]/Yes []  Respiratory:             Cough:   No [x]/Yes []      Pleuritic chest pain:  No [x]/Yes []                        Dyspnea:   No [x]/Yes []      Wheezing:   No [x]/Yes []  Cardiovascular:             Angina:   No [x]/Yes []      Palpitations:   No [x]/Yes []          Claudication:    No [x]/Yes []      Leg swelling:   No [x]/Yes []  Gastrointestinal:             Nausea or vomiting:  No [x]/Yes []               Abdominal pain:  No [x]/Yes []                     Intestinal bleeding: No [x]/Yes []  Musculoskeletal:             Leg pain:   No [x]/Yes []      Back pain:   No [x]/Yes []                    Weakness:   No [x]/Yes []  Neurologic:             Numbness:   No [x]/Yes []      Paralysis:   No [x]/Yes []                       Headaches:   No [x]/Yes []  Hematologic, lymphatic:   Anemia:   No [x]/Yes []              Bleeding or bruising:  No [x]/Yes []              Fevers or chills: No [x]/Yes []  Endocrine:             Temp intolerance:   No [x]/Yes []                       Polydipsia, polyuria:  No [x]/Yes []  Skin:              Rash:    No [x]/Yes []      Ulcers:   No [x]/Yes []              Abnorm pigment: No [x]/Yes []  :              Frequency/urgency:  No [x]/Yes []      Hematuria:    No [x]/Yes []                      Incontinence:    No [x]/Yes []    PHYSICAL EXAM:  Vitals:

## 2019-07-03 NOTE — CONSULTS
with Dr. Aurora Chamberlain, COLON, DIAGNOSTIC      HEMICOLECTOMY Right 11/12/2015    lysis of adhesions    HYSTERECTOMY      OTHER SURGICAL HISTORY Left 12/7/2015    insertion of mediport left    IL BIOPSY OF BREAST, NEEDLE CORE Right 10/26/2018    NEEDLE LOCALIZATION RIGHT BREAST LUMPECTOMY WITH SENTINEL NODE, BLUE DYE INJECTION WITH  NEOPROBE AND TRIDENT performed by Jimmy Go MD at [facility]    TONSILLECTOMY      UPPER GASTROINTESTINAL ENDOSCOPY  12/7/2015       Family History  History reviewed. No pertinent family history. Social History  No illicit drugs    TOBACCO:   reports that she has quit smoking. She has never used smokeless tobacco.  ETOH:   reports that she does not drink alcohol. Home Medications  Prior to Admission medications    Medication Sig Start Date End Date Taking?  Authorizing Provider   furosemide (LASIX) 20 MG tablet Take 20 mg by mouth every morning (before breakfast)    Yes Historical Provider, MD   benzonatate (TESSALON) 100 MG capsule Take 100 mg by mouth 3 times daily as needed for Cough   Yes Historical Provider, MD   loperamide (IMODIUM) 2 MG capsule Take 2 mg by mouth 4 times daily as needed for Diarrhea   Yes Historical Provider, MD   levothyroxine (SYNTHROID) 75 MCG tablet Take 75 mcg by mouth every morning    Yes Historical Provider, MD   omeprazole (PRILOSEC) 20 MG capsule Take 20 mg by mouth every morning    Yes Historical Provider, MD   amitriptyline (ELAVIL) 10 MG tablet Take 10 mg by mouth every morning    Yes Historical Provider, MD   pravastatin (PRAVACHOL) 80 MG tablet Take 80 mg by mouth every morning    Yes Historical Provider, MD   potassium chloride SA (KLOR-CON M20) 20 MEQ tablet Take 20 mEq by mouth every morning    Yes Historical Provider, MD   citalopram (CELEXA) 20 MG tablet Take 20 mg by mouth every morning    Yes Historical Provider, MD organomegaly  Skin:  No rash  Neurologic:Cranial nerves grossly intact. No focal motor or sensory deficits . Recent Laboratory Data-   Lab Results   Component Value Date    WBC 8.6 07/03/2019    HGB 12.1 07/03/2019    HCT 40.1 07/03/2019    MCV 83.9 07/03/2019     07/03/2019    LYMPHOPCT 14.5 (L) 07/03/2019    RBC 4.78 07/03/2019    MCH 25.3 (L) 07/03/2019    MCHC 30.2 (L) 07/03/2019    RDW 19.3 (H) 07/03/2019    NEUTOPHILPCT 75.1 07/03/2019    MONOPCT 8.4 07/03/2019    BASOPCT 0.8 07/03/2019    NEUTROABS 6.48 07/03/2019    LYMPHSABS 1.25 (L) 07/03/2019    MONOSABS 0.72 07/03/2019    EOSABS 0.06 07/03/2019    BASOSABS 0.07 07/03/2019       Lab Results   Component Value Date     07/03/2019    K 4.7 07/03/2019     07/03/2019    CO2 17 (L) 07/03/2019    BUN 36 (H) 07/03/2019    CREATININE 1.6 (H) 07/03/2019    GLUCOSE 101 (H) 07/03/2019    CALCIUM 8.7 07/03/2019    PROT 6.7 07/03/2019    LABALBU 3.7 07/03/2019    BILITOT 1.4 (H) 07/03/2019    ALKPHOS 72 07/03/2019    AST 13 07/03/2019    ALT 7 07/03/2019    LABGLOM 31 07/03/2019    GFRAA 37 07/03/2019       No results found for: IRON, TIBC, FERRITIN        Radiology-    US DUP LOWER EXTREMITIES BILATERAL VENOUS   Final Result   1. No evidence of deep venous thrombosis or occlusion within the   visualized right lower extremity venous systems   2. No evidence of deep venous thrombosis or occlusion within the   visualized left lower extremity venous systems. US DUP UPPER EXTREMITY LEFT VENOUS   Final Result   ALERT:  THIS IS AN ABNORMAL REPORT. Findings communicated   directly with Dr. Obed Smith on 7/3/2019 12:52 PM .   1. Occlusive deep venous thrombosis involving the left subclavian,   axillary, brachial, radial and ulnar veins. Nearly completely   occlusive deep venous thrombosis left internal jugular vein. 2. Occlusive superficial thrombosis left basilic vein.             ASSESSMENT/PLAN :  82 yo female  IDC R breast, s/p lumpectomy

## 2019-07-03 NOTE — ED PROVIDER NOTES
Patient is a 81year old female with hx of CA and right breast lumpectomy presenting to he ED for bilateral leg swelling over the last 3 weeks and left arm swelling over the last few days. Pt complains of feeling generally weak and fatigued as well. Pt spoke with her oncologist who recommended pt to the ED for further evaluation. Pt had an ECHO done last week which was okay. Pt denies any injury/trauma, fall, numbness, fever, chills, chest pain, abdominal pain, nausea, emesis, or any further complaints. Pt has chronic shortness of breath d/t pulmonary fibrosis. The history is provided by the patient and a relative. No  was used. Leg Injury   Lower extremity pain location: bilateral legs and left arm. Injury: no    Pain details:     Timing:  Constant  Chronicity:  New  Associated symptoms: swelling    Associated symptoms: no fever    Risk factors comment:  Hx of CA      Review of Systems   Constitutional: Negative for chills and fever. HENT: Negative for congestion and sore throat. Eyes: Negative. Respiratory: Positive for shortness of breath (chronic). Cardiovascular: Positive for leg swelling. Negative for chest pain. Gastrointestinal: Negative for nausea and vomiting. Lower abdominal wall swelling   Genitourinary: Negative for dysuria and flank pain. Musculoskeletal:        Left arm swelling   Skin: Negative. Neurological: Negative for syncope and headaches. All other systems reviewed and are negative. Physical Exam   Constitutional: She is oriented to person, place, and time. She appears well-developed and well-nourished. HENT:   Head: Normocephalic and atraumatic. Eyes: Pupils are equal, round, and reactive to light. EOM are normal.   Neck: Normal range of motion. Neck supple. Cardiovascular: Normal rate, regular rhythm, normal heart sounds and intact distal pulses.    Pulmonary/Chest: Effort normal and breath sounds normal.   NC in place

## 2019-07-04 LAB
ANION GAP SERPL CALCULATED.3IONS-SCNC: 10 MMOL/L (ref 7–16)
BUN BLDV-MCNC: 34 MG/DL (ref 8–23)
CALCIUM SERPL-MCNC: 8.8 MG/DL (ref 8.6–10.2)
CHLORIDE BLD-SCNC: 103 MMOL/L (ref 98–107)
CO2: 22 MMOL/L (ref 22–29)
CREAT SERPL-MCNC: 1.6 MG/DL (ref 0.5–1)
GFR AFRICAN AMERICAN: 37
GFR NON-AFRICAN AMERICAN: 31 ML/MIN/1.73
GLUCOSE BLD-MCNC: 105 MG/DL (ref 74–99)
HCT VFR BLD CALC: 39 % (ref 34–48)
HEMOGLOBIN: 11.7 G/DL (ref 11.5–15.5)
MCH RBC QN AUTO: 25.2 PG (ref 26–35)
MCHC RBC AUTO-ENTMCNC: 30 % (ref 32–34.5)
MCV RBC AUTO: 84.1 FL (ref 80–99.9)
PDW BLD-RTO: 19 FL (ref 11.5–15)
PLATELET # BLD: 185 E9/L (ref 130–450)
PMV BLD AUTO: 10 FL (ref 7–12)
POTASSIUM SERPL-SCNC: 4.4 MMOL/L (ref 3.5–5)
RBC # BLD: 4.64 E12/L (ref 3.5–5.5)
SODIUM BLD-SCNC: 135 MMOL/L (ref 132–146)
T4 TOTAL: 6.7 MCG/DL (ref 4.5–11.7)
TSH SERPL DL<=0.05 MIU/L-ACNC: 10.9 UIU/ML (ref 0.27–4.2)
WBC # BLD: 9 E9/L (ref 4.5–11.5)

## 2019-07-04 PROCEDURE — 2060000000 HC ICU INTERMEDIATE R&B

## 2019-07-04 PROCEDURE — 36415 COLL VENOUS BLD VENIPUNCTURE: CPT

## 2019-07-04 PROCEDURE — 85027 COMPLETE CBC AUTOMATED: CPT

## 2019-07-04 PROCEDURE — 2700000000 HC OXYGEN THERAPY PER DAY

## 2019-07-04 PROCEDURE — 84443 ASSAY THYROID STIM HORMONE: CPT

## 2019-07-04 PROCEDURE — 2580000003 HC RX 258: Performed by: EMERGENCY MEDICINE

## 2019-07-04 PROCEDURE — 36591 DRAW BLOOD OFF VENOUS DEVICE: CPT

## 2019-07-04 PROCEDURE — 6370000000 HC RX 637 (ALT 250 FOR IP): Performed by: INTERNAL MEDICINE

## 2019-07-04 PROCEDURE — 6360000002 HC RX W HCPCS: Performed by: INTERNAL MEDICINE

## 2019-07-04 PROCEDURE — 94640 AIRWAY INHALATION TREATMENT: CPT

## 2019-07-04 PROCEDURE — 80048 BASIC METABOLIC PNL TOTAL CA: CPT

## 2019-07-04 PROCEDURE — 84436 ASSAY OF TOTAL THYROXINE: CPT

## 2019-07-04 PROCEDURE — 94664 DEMO&/EVAL PT USE INHALER: CPT

## 2019-07-04 RX ORDER — IPRATROPIUM BROMIDE AND ALBUTEROL SULFATE 2.5; .5 MG/3ML; MG/3ML
1 SOLUTION RESPIRATORY (INHALATION)
Status: DISCONTINUED | OUTPATIENT
Start: 2019-07-04 | End: 2019-07-06 | Stop reason: HOSPADM

## 2019-07-04 RX ORDER — LEVOTHYROXINE SODIUM 88 UG/1
88 TABLET ORAL EVERY MORNING
Status: DISCONTINUED | OUTPATIENT
Start: 2019-07-04 | End: 2019-07-06 | Stop reason: HOSPADM

## 2019-07-04 RX ORDER — FUROSEMIDE 20 MG/1
20 TABLET ORAL DAILY
Status: COMPLETED | OUTPATIENT
Start: 2019-07-04 | End: 2019-07-04

## 2019-07-04 RX ADMIN — IPRATROPIUM BROMIDE AND ALBUTEROL SULFATE 1 AMPULE: .5; 3 SOLUTION RESPIRATORY (INHALATION) at 17:22

## 2019-07-04 RX ADMIN — LEVOTHYROXINE SODIUM 88 MCG: 88 TABLET ORAL at 08:23

## 2019-07-04 RX ADMIN — Medication 10 ML: at 20:40

## 2019-07-04 RX ADMIN — ACETAMINOPHEN 650 MG: 325 TABLET ORAL at 14:27

## 2019-07-04 RX ADMIN — ACETAMINOPHEN 650 MG: 325 TABLET ORAL at 05:58

## 2019-07-04 RX ADMIN — PRAVASTATIN SODIUM 80 MG: 20 TABLET ORAL at 08:23

## 2019-07-04 RX ADMIN — BENZONATATE 100 MG: 100 CAPSULE ORAL at 14:27

## 2019-07-04 RX ADMIN — PANTOPRAZOLE SODIUM 40 MG: 40 TABLET, DELAYED RELEASE ORAL at 05:58

## 2019-07-04 RX ADMIN — Medication 10 ML: at 04:15

## 2019-07-04 RX ADMIN — IPRATROPIUM BROMIDE AND ALBUTEROL SULFATE 1 AMPULE: .5; 3 SOLUTION RESPIRATORY (INHALATION) at 21:18

## 2019-07-04 RX ADMIN — IPRATROPIUM BROMIDE AND ALBUTEROL SULFATE 1 AMPULE: .5; 3 SOLUTION RESPIRATORY (INHALATION) at 12:40

## 2019-07-04 RX ADMIN — ENOXAPARIN SODIUM 70 MG: 80 INJECTION SUBCUTANEOUS at 15:36

## 2019-07-04 RX ADMIN — ACETAMINOPHEN 650 MG: 325 TABLET ORAL at 18:38

## 2019-07-04 RX ADMIN — BENZONATATE 100 MG: 100 CAPSULE ORAL at 05:58

## 2019-07-04 RX ADMIN — CITALOPRAM HYDROBROMIDE 20 MG: 20 TABLET ORAL at 08:23

## 2019-07-04 RX ADMIN — POTASSIUM CHLORIDE 20 MEQ: 20 TABLET, EXTENDED RELEASE ORAL at 08:23

## 2019-07-04 RX ADMIN — Medication 10 ML: at 04:29

## 2019-07-04 RX ADMIN — Medication 10 ML: at 08:23

## 2019-07-04 RX ADMIN — Medication 10 ML: at 04:20

## 2019-07-04 RX ADMIN — FUROSEMIDE 20 MG: 20 TABLET ORAL at 05:58

## 2019-07-04 RX ADMIN — IPRATROPIUM BROMIDE AND ALBUTEROL SULFATE 1 AMPULE: .5; 3 SOLUTION RESPIRATORY (INHALATION) at 09:22

## 2019-07-04 RX ADMIN — AMITRIPTYLINE HYDROCHLORIDE 10 MG: 10 TABLET, FILM COATED ORAL at 08:23

## 2019-07-04 RX ADMIN — FUROSEMIDE 20 MG: 20 TABLET ORAL at 08:43

## 2019-07-04 RX ADMIN — Medication 500 UNITS: at 04:29

## 2019-07-04 ASSESSMENT — PAIN DESCRIPTION - DESCRIPTORS
DESCRIPTORS: ACHING;SORE
DESCRIPTORS: ACHING;DISCOMFORT

## 2019-07-04 ASSESSMENT — PAIN SCALES - GENERAL
PAINLEVEL_OUTOF10: 0
PAINLEVEL_OUTOF10: 4
PAINLEVEL_OUTOF10: 7
PAINLEVEL_OUTOF10: 0
PAINLEVEL_OUTOF10: 0
PAINLEVEL_OUTOF10: 8
PAINLEVEL_OUTOF10: 3
PAINLEVEL_OUTOF10: 0

## 2019-07-04 ASSESSMENT — PAIN DESCRIPTION - FREQUENCY
FREQUENCY: INTERMITTENT
FREQUENCY: INTERMITTENT

## 2019-07-04 ASSESSMENT — PAIN DESCRIPTION - LOCATION
LOCATION: RIB CAGE
LOCATION: RIB CAGE

## 2019-07-04 ASSESSMENT — PAIN DESCRIPTION - ONSET
ONSET: ON-GOING
ONSET: ON-GOING

## 2019-07-04 ASSESSMENT — PAIN DESCRIPTION - ORIENTATION
ORIENTATION: RIGHT;LEFT
ORIENTATION: RIGHT;LEFT

## 2019-07-04 ASSESSMENT — PAIN DESCRIPTION - PAIN TYPE
TYPE: ACUTE PAIN
TYPE: ACUTE PAIN

## 2019-07-04 ASSESSMENT — PAIN DESCRIPTION - PROGRESSION
CLINICAL_PROGRESSION: NOT CHANGED
CLINICAL_PROGRESSION: NOT CHANGED

## 2019-07-04 NOTE — H&P
signs  Extremities:  No clubbing, cyanosis, or  2/4 pedal-lower calf edema,  2/4 left upper ext edema  Skin:  Warm and dry, no open lesions or rash  Neuro:  Cranial nerves 2-12 intact, no focal deficits  Breast: deferred  Rectal: deferred  Genitalia:  deferred    LABS:  CBC:   Lab Results   Component Value Date    WBC 9.0 07/04/2019    RBC 4.64 07/04/2019    HGB 11.7 07/04/2019    HCT 39.0 07/04/2019    MCV 84.1 07/04/2019    MCH 25.2 07/04/2019    MCHC 30.0 07/04/2019    RDW 19.0 07/04/2019     07/04/2019    MPV 10.0 07/04/2019     BMP:    Lab Results   Component Value Date     07/04/2019    K 4.4 07/04/2019    K 4.7 07/03/2019     07/04/2019    CO2 22 07/04/2019    BUN 34 07/04/2019    LABALBU 3.7 07/03/2019    CREATININE 1.6 07/04/2019    CALCIUM 8.8 07/04/2019    GFRAA 37 07/04/2019    LABGLOM 31 07/04/2019    GLUCOSE 105 07/04/2019         ASSESSMENT:      Patient Active Hospital Problem List:   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 Plan: Lovenox with change to NOAC        PLAN:    Present tx.     Marlin Torrez  7:59 AM  7/4/2019

## 2019-07-04 NOTE — PLAN OF CARE
Problem: Falls - Risk of:  Goal: Will remain free from falls  Description  Will remain free from falls  7/4/2019 1256 by Jose Urias RN  Outcome: Met This Shift  Goal: Absence of physical injury  Description  Absence of physical injury  Outcome: Met This Shift     Problem: Pain:  Description  Pain management should include both nonpharmacologic and pharmacologic interventions.   Goal: Pain level will decrease  Description  Pain level will decrease  7/4/2019 1256 by Jose Urias RN  Outcome: Met This Shift  Goal: Control of acute pain  Description  Control of acute pain  7/4/2019 1256 by Jose Urias RN  Outcome: Met This Shift

## 2019-07-05 LAB
ANION GAP SERPL CALCULATED.3IONS-SCNC: 14 MMOL/L (ref 7–16)
BUN BLDV-MCNC: 31 MG/DL (ref 8–23)
CALCIUM SERPL-MCNC: 8.3 MG/DL (ref 8.6–10.2)
CHLORIDE BLD-SCNC: 102 MMOL/L (ref 98–107)
CO2: 21 MMOL/L (ref 22–29)
CREAT SERPL-MCNC: 1.4 MG/DL (ref 0.5–1)
GFR AFRICAN AMERICAN: 43
GFR NON-AFRICAN AMERICAN: 36 ML/MIN/1.73
GLUCOSE BLD-MCNC: 97 MG/DL (ref 74–99)
HCT VFR BLD CALC: 37.6 % (ref 34–48)
HEMOGLOBIN: 11.1 G/DL (ref 11.5–15.5)
MCH RBC QN AUTO: 25.2 PG (ref 26–35)
MCHC RBC AUTO-ENTMCNC: 29.5 % (ref 32–34.5)
MCV RBC AUTO: 85.3 FL (ref 80–99.9)
PDW BLD-RTO: 18.9 FL (ref 11.5–15)
PLATELET # BLD: 177 E9/L (ref 130–450)
PMV BLD AUTO: 9.9 FL (ref 7–12)
POTASSIUM SERPL-SCNC: 4.2 MMOL/L (ref 3.5–5)
RBC # BLD: 4.41 E12/L (ref 3.5–5.5)
SODIUM BLD-SCNC: 137 MMOL/L (ref 132–146)
WBC # BLD: 8.5 E9/L (ref 4.5–11.5)

## 2019-07-05 PROCEDURE — 80048 BASIC METABOLIC PNL TOTAL CA: CPT

## 2019-07-05 PROCEDURE — 6370000000 HC RX 637 (ALT 250 FOR IP): Performed by: INTERNAL MEDICINE

## 2019-07-05 PROCEDURE — 2700000000 HC OXYGEN THERAPY PER DAY

## 2019-07-05 PROCEDURE — 2580000003 HC RX 258: Performed by: EMERGENCY MEDICINE

## 2019-07-05 PROCEDURE — 97161 PT EVAL LOW COMPLEX 20 MIN: CPT

## 2019-07-05 PROCEDURE — 36591 DRAW BLOOD OFF VENOUS DEVICE: CPT

## 2019-07-05 PROCEDURE — 85027 COMPLETE CBC AUTOMATED: CPT

## 2019-07-05 PROCEDURE — 94640 AIRWAY INHALATION TREATMENT: CPT

## 2019-07-05 PROCEDURE — 97165 OT EVAL LOW COMPLEX 30 MIN: CPT

## 2019-07-05 PROCEDURE — 2060000000 HC ICU INTERMEDIATE R&B

## 2019-07-05 PROCEDURE — 36415 COLL VENOUS BLD VENIPUNCTURE: CPT

## 2019-07-05 RX ORDER — GUAIFENESIN/DEXTROMETHORPHAN 100-10MG/5
5 SYRUP ORAL EVERY 4 HOURS PRN
Status: DISCONTINUED | OUTPATIENT
Start: 2019-07-05 | End: 2019-07-05

## 2019-07-05 RX ADMIN — IPRATROPIUM BROMIDE AND ALBUTEROL SULFATE 1 AMPULE: .5; 3 SOLUTION RESPIRATORY (INHALATION) at 12:53

## 2019-07-05 RX ADMIN — FUROSEMIDE 20 MG: 20 TABLET ORAL at 05:51

## 2019-07-05 RX ADMIN — Medication 10 ML: at 08:22

## 2019-07-05 RX ADMIN — APIXABAN 10 MG: 5 TABLET, FILM COATED ORAL at 21:05

## 2019-07-05 RX ADMIN — Medication 10 ML: at 21:06

## 2019-07-05 RX ADMIN — IPRATROPIUM BROMIDE AND ALBUTEROL SULFATE 1 AMPULE: .5; 3 SOLUTION RESPIRATORY (INHALATION) at 08:48

## 2019-07-05 RX ADMIN — CITALOPRAM HYDROBROMIDE 20 MG: 20 TABLET ORAL at 08:22

## 2019-07-05 RX ADMIN — AMITRIPTYLINE HYDROCHLORIDE 10 MG: 10 TABLET, FILM COATED ORAL at 08:21

## 2019-07-05 RX ADMIN — HYDROCODONE BITARTRATE AND HOMATROPINE METHYLBROMIDE 5 ML: 5; 1.5 SOLUTION ORAL at 21:36

## 2019-07-05 RX ADMIN — PRAVASTATIN SODIUM 80 MG: 20 TABLET ORAL at 08:21

## 2019-07-05 RX ADMIN — GUAIFENESIN AND DEXTROMETHORPHAN 5 ML: 100; 10 SYRUP ORAL at 08:21

## 2019-07-05 RX ADMIN — LEVOTHYROXINE SODIUM 88 MCG: 88 TABLET ORAL at 05:51

## 2019-07-05 RX ADMIN — BENZONATATE 100 MG: 100 CAPSULE ORAL at 03:50

## 2019-07-05 RX ADMIN — HYDROCODONE BITARTRATE AND HOMATROPINE METHYLBROMIDE 5 ML: 5; 1.5 SOLUTION ORAL at 16:32

## 2019-07-05 RX ADMIN — ACETAMINOPHEN 650 MG: 325 TABLET ORAL at 03:50

## 2019-07-05 RX ADMIN — PANTOPRAZOLE SODIUM 40 MG: 40 TABLET, DELAYED RELEASE ORAL at 05:51

## 2019-07-05 RX ADMIN — IPRATROPIUM BROMIDE AND ALBUTEROL SULFATE 1 AMPULE: .5; 3 SOLUTION RESPIRATORY (INHALATION) at 20:44

## 2019-07-05 RX ADMIN — IPRATROPIUM BROMIDE AND ALBUTEROL SULFATE 1 AMPULE: .5; 3 SOLUTION RESPIRATORY (INHALATION) at 16:59

## 2019-07-05 RX ADMIN — APIXABAN 10 MG: 5 TABLET, FILM COATED ORAL at 08:21

## 2019-07-05 RX ADMIN — POTASSIUM CHLORIDE 20 MEQ: 20 TABLET, EXTENDED RELEASE ORAL at 08:22

## 2019-07-05 ASSESSMENT — PAIN DESCRIPTION - FREQUENCY: FREQUENCY: INTERMITTENT

## 2019-07-05 ASSESSMENT — PAIN DESCRIPTION - DESCRIPTORS: DESCRIPTORS: ACHING;DULL;SORE

## 2019-07-05 ASSESSMENT — PAIN DESCRIPTION - ONSET: ONSET: ON-GOING

## 2019-07-05 ASSESSMENT — PAIN SCALES - GENERAL
PAINLEVEL_OUTOF10: 7
PAINLEVEL_OUTOF10: 4
PAINLEVEL_OUTOF10: 0
PAINLEVEL_OUTOF10: 0

## 2019-07-05 ASSESSMENT — PAIN DESCRIPTION - LOCATION: LOCATION: HEAD;THROAT

## 2019-07-05 ASSESSMENT — PAIN DESCRIPTION - PROGRESSION: CLINICAL_PROGRESSION: GRADUALLY WORSENING

## 2019-07-05 ASSESSMENT — PAIN - FUNCTIONAL ASSESSMENT: PAIN_FUNCTIONAL_ASSESSMENT: ACTIVITIES ARE NOT PREVENTED

## 2019-07-05 ASSESSMENT — PAIN DESCRIPTION - PAIN TYPE: TYPE: ACUTE PAIN

## 2019-07-05 NOTE — CARE COORDINATION
Met with pt at bedside; introduced myself as an RN case manager and explained my role. Pt expressed understanding of current course of treatment/plan of care. Pt started on Eliquis; 30 day free discount card given with instruction. Advised pt to follow up with physicians as instructed at discharge to assure continuance of this medication,. Pt understands and agrees. Pt states she lives alone. No reported recent incidents of falls at home. uses no assistive devices. Has home 02 through [facility] supply. plan is for home at discharge. Will follow clinically as needed. Shawn Roberts RN,CM.

## 2019-07-05 NOTE — PLAN OF CARE
Problem: Falls - Risk of:  Goal: Will remain free from falls  Description  Will remain free from falls  7/5/2019 0241 by Gage Ku RN  Outcome: Met This Shift  7/4/2019 1256 by Holly Sánchez RN  Outcome: Met This Shift     Problem: Falls - Risk of:  Goal: Absence of physical injury  Description  Absence of physical injury  7/5/2019 0241 by Gage Ku RN  Outcome: Met This Shift  7/4/2019 1256 by Holly Sánchez RN  Outcome: Met This Shift

## 2019-07-05 NOTE — DISCHARGE INSTR - COC
no immunization history on file for this patient. Active Problems:  Patient Active Problem List   Diagnosis Code    Cecal cancer ([facility] C18.0    Pulmonary fibrosis ([facility] J84.10    Essential hypertension I10    Acquired hypothyroidism E03.9    High anion gap metabolic acidosis Q75.8    Lactic acidosis E87.2    Gastroenteritis K52.9    Acute kidney injury ([facility] N17.9    Malignant neoplasm of upper-outer quadrant of right breast in female, estrogen receptor negative ([facility] C50.411, Z17.1    Hypotension I95.9    Acute deep vein thrombosis (DVT) of axillary vein of left upper extremity ([facility] I82. A12       Isolation/Infection:   Isolation          No Isolation            Nurse Assessment:  Last Vital Signs: BP (!) 155/83   Pulse 98   Temp 97.8 °F (36.6 °C) (Oral)   Resp 19   Ht 5' (1.524 m)   Wt 156 lb 8 oz (71 kg)   SpO2 91%   BMI 30.56 kg/m²     Last documented pain score (0-10 scale): Pain Level: 0  Last Weight:   Wt Readings from Last 1 Encounters:   07/06/19 156 lb 8 oz (71 kg)     Mental Status:  oriented and alert    IV Access:  - None    Nursing Mobility/ADLs:  Walking   Assisted  Transfer  Assisted  Bathing  Assisted  Dressing  Assisted  Toileting  Assisted  Feeding  Independent  Med Admin  Independent  Med Delivery   whole    Wound Care Documentation and Therapy:        Elimination:  Continence:   · Bowel: Yes  · Bladder: Yes  Urinary Catheter: None   Colostomy/Ileostomy/Ileal Conduit: No       Date of Last BM: 7/4/2019    Intake/Output Summary (Last 24 hours) at 7/6/2019 1056  Last data filed at 7/6/2019 0428  Gross per 24 hour   Intake 580 ml   Output 100 ml   Net 480 ml     I/O last 3 completed shifts: In: 36 [P.O.:760]  Out: 500 [Urine:500]    Safety Concerns: At Risk for Falls    Impairments/Disabilities:      None    Nutrition Therapy:  Current Nutrition Therapy:   - Oral Diet:  Cardiac    Routes of Feeding: Oral  Liquids:  Thin Liquids  Daily Fluid Restriction: no  Last Modified

## 2019-07-05 NOTE — PROGRESS NOTES
Dr. Jose Silva notified of consult via perfect serve waiting for manuel Guerin [facility]/[place]/[place] 7/4/2019 1517p  Dr. Juan Reed notification of consult via perfect serve also at [phone]  REGINA Kerr [facility]/[place]/[place]  7/4/2019 3:18 PM
[facility]  Hematology/Oncology          Patient Name: Constantin Keyes  YOB: 1935  PCP: Ban Penaloza MD   Referring Provider:      Reason for Consultation:   Chief Complaint   Patient presents with    Leg Swelling     left arm swelling states she is a cancer patient. Subjective:  Reports improvement in swelling in her left upper extremity. History of Present Illness: This pt is a very pleasant 80 yo female who follows with Dr. Corry Robison for ER/IL-, HER2+ IDC of the R breast, s/p neoadj chemo, lumpectomy and now on maintenance herceptin. She presented to the ER with worsening BL LE and LUE edema and US subsequently showed a LUE extensive VTE and she was admitted for further therapy. 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      Diagnostic Data:     Past Medical History:   Diagnosis Date    Anemia     Colon cancer ([address] 11/15    Hypertension     On home oxygen therapy     3L PRN     Pulmonary fibrosis ([address]     Thyroid disease        Patient Active Problem List    Diagnosis Date Noted    Acute deep vein thrombosis (DVT) of axillary vein of left upper extremity ([address] 07/03/2019    Hypotension 04/24/2019    Malignant neoplasm of upper-outer quadrant of right breast in female, estrogen receptor negative ([address] 11/22/2018    High anion gap metabolic acidosis 21/51/7825    Lactic acidosis 06/13/2016    Gastroenteritis 06/13/2016    Acute kidney injury ([address] 06/13/2016    Cecal cancer ([address] 11/12/2015    Pulmonary fibrosis ([address] 11/12/2015    Essential hypertension 11/12/2015    Acquired hypothyroidism 11/12/2015        Past Surgical History:   Procedure Laterality Date    APPENDECTOMY      BREAST LUMPECTOMY Right 10/26/2018    with Dr. Eve Bahena, COLON, DIAGNOSTIC      HEMICOLECTOMY Right 11/12/2015    lysis of
Therese Wilkerson,    Your patient is on a medication that requires a renal dose adjustment. Renal Function Assessment:    Date Body Weight IBW Adj. Body Weight Scr CrCl Dialysis status   7/3/2019 69.5 kg   1.6 23 ml/min        Pharmacy has renally dose-adjusted the following medication(s):    Date Medication Original Dosing Regimen New Dosing Regimen   7/3/2019 Lovenox  1 mg/kg Sub-Q q12h 1 mg/kg Sub-Q q24h           These changes were made per protocol according to the [facility] Renal Function-Based Dose Adjustments Policy    *Please note this dose may need readjusted if your patient's renal function significantly improves. Please contact pharmacy with any questions regarding these changes.
noted in the objective portion of this evaluation. Comments/Treatment:  Pt unsteady with ambulation. Narrow base of support and assist to guide walker. Decreased safety with transfer. SpO2 on 5L after ambulation 78%. Pt recovered to 90% with seated rest and cues for breathing technique. Nursing notified. Patient education  Pt educated on safety with transfers    Patient response to education:   Pt verbalized understanding Pt demonstrated skill Pt requires further education in this area   no no yes     Rehab potential is good for reaching above PT goals. Pts/ family goals   1. To get stronger    Patient and or family understand(s) diagnosis, prognosis, and plan of care. PLAN  PT care will be provided in accordance with the objectives noted above. Whenever appropriate, clear delegation orders will be provided for nursing staff. Exercises and functional mobility practice will be used as well as appropriate assistive devices or modalities to obtain goals. Patient and family education will also be administered as needed. Frequency of treatments will be 2-5x/week x 5 days.     Berenice PT  [id number]
[x]  Cognitive re-training []   Compensatory techniques for ADLs [x]  Splinting Needs []   Positioning to improve overall function [x]   Therapeutic Activity [x]  Therapeutic Exercise  [x]  Visual/Perceptual: []    Delirium prevention/treatment  []   Other:  []    Rehab Potential: Good for established goals     Patient / Family Goal: To get home. Patient and/or family were instructed on functional diagnosis, prognosis/goals and OT plan of care. Pt  verbalized good understanding. Upon arrival, patient sitting in supine in bed . At end of session, patient sitting in bedside chair with call light and phone within reach, all lines and tubes intact. Pt would benefit from continued skilled OT to increase safety and independence with completion of ADL/IADL tasks for functional independence and quality of life.  Bed/chair alarm: yes    Low Evaluation   Evaluation time includes thorough review of current medical information, gathering information on past medical history/social history and prior level of function, completion of standardized testing/informal observation of tasks, assessment of data, and development of POC/Goals    Anjum Fleming, OTR/L [id number]
2. No evidence of deep venous thrombosis or occlusion within the   visualized left lower extremity venous systems. US DUP UPPER EXTREMITY LEFT VENOUS   Final Result   ALERT:  THIS IS AN ABNORMAL REPORT. Findings communicated   directly with Dr. Lana Peacock on 7/3/2019 12:52 PM .   1. Occlusive deep venous thrombosis involving the left subclavian,   axillary, brachial, radial and ulnar veins. Nearly completely   occlusive deep venous thrombosis left internal jugular vein. 2. Occlusive superficial thrombosis left basilic vein. ASSESSMENT/PLAN :  80 yo female  IDC R breast, s/p lumpectomy 10/18 following Neoadj. chemo -- Taxotere, Herception, Perjeta T1c N0. ER & AK neg, Her2 / new +  Remains on adj Herceptin  c/o dyspnea   H/o stage 3 b colon cancer 2015 , s/p Rx with 5 Fu / LV  H/o cancer of lower third esophagus  Renal insufficiency- recent  Edema legs last several days. on diuretic from  , COPD , Rt. heart failure   Now with LUE swelling as well    - ECHO 6/19 -- EF 55%, Rt.ventricle & atrial enlargement, tricuspid regurgitation  - US showing extensive LUE DVT, no BL LE DVT  -She has been transitioned from Lovenox to Eliquis. Sign off. Will follow as outpatient.       Electronically signed by Mary Ann Stafford MD on 7/5/2019 at 1:49 PM

## 2019-07-06 VITALS
SYSTOLIC BLOOD PRESSURE: 155 MMHG | HEIGHT: 60 IN | RESPIRATION RATE: 19 BRPM | HEART RATE: 98 BPM | OXYGEN SATURATION: 91 % | BODY MASS INDEX: 30.73 KG/M2 | DIASTOLIC BLOOD PRESSURE: 83 MMHG | TEMPERATURE: 97.8 F | WEIGHT: 156.5 LBS

## 2019-07-06 LAB
ANION GAP SERPL CALCULATED.3IONS-SCNC: 11 MMOL/L (ref 7–16)
BUN BLDV-MCNC: 29 MG/DL (ref 8–23)
CALCIUM SERPL-MCNC: 8.6 MG/DL (ref 8.6–10.2)
CHLORIDE BLD-SCNC: 101 MMOL/L (ref 98–107)
CO2: 23 MMOL/L (ref 22–29)
CREAT SERPL-MCNC: 1.4 MG/DL (ref 0.5–1)
GFR AFRICAN AMERICAN: 43
GFR NON-AFRICAN AMERICAN: 36 ML/MIN/1.73
GLUCOSE BLD-MCNC: 113 MG/DL (ref 74–99)
HCT VFR BLD CALC: 37.9 % (ref 34–48)
HEMOGLOBIN: 11.2 G/DL (ref 11.5–15.5)
MCH RBC QN AUTO: 25.5 PG (ref 26–35)
MCHC RBC AUTO-ENTMCNC: 29.6 % (ref 32–34.5)
MCV RBC AUTO: 86.1 FL (ref 80–99.9)
PDW BLD-RTO: 19 FL (ref 11.5–15)
PLATELET # BLD: 178 E9/L (ref 130–450)
PMV BLD AUTO: 10.1 FL (ref 7–12)
POTASSIUM SERPL-SCNC: 4.8 MMOL/L (ref 3.5–5)
RBC # BLD: 4.4 E12/L (ref 3.5–5.5)
SODIUM BLD-SCNC: 135 MMOL/L (ref 132–146)
WBC # BLD: 7.7 E9/L (ref 4.5–11.5)

## 2019-07-06 PROCEDURE — 6370000000 HC RX 637 (ALT 250 FOR IP): Performed by: INTERNAL MEDICINE

## 2019-07-06 PROCEDURE — 6360000002 HC RX W HCPCS: Performed by: INTERNAL MEDICINE

## 2019-07-06 PROCEDURE — 85027 COMPLETE CBC AUTOMATED: CPT

## 2019-07-06 PROCEDURE — 37799 UNLISTED PX VASCULAR SURGERY: CPT

## 2019-07-06 PROCEDURE — 2700000000 HC OXYGEN THERAPY PER DAY

## 2019-07-06 PROCEDURE — 2580000003 HC RX 258: Performed by: EMERGENCY MEDICINE

## 2019-07-06 PROCEDURE — 80048 BASIC METABOLIC PNL TOTAL CA: CPT

## 2019-07-06 PROCEDURE — 94640 AIRWAY INHALATION TREATMENT: CPT

## 2019-07-06 PROCEDURE — 36415 COLL VENOUS BLD VENIPUNCTURE: CPT

## 2019-07-06 RX ORDER — IPRATROPIUM BROMIDE AND ALBUTEROL SULFATE 2.5; .5 MG/3ML; MG/3ML
3 SOLUTION RESPIRATORY (INHALATION)
Qty: 360 ML | DISCHARGE
Start: 2019-07-06 | End: 2019-09-25

## 2019-07-06 RX ORDER — LEVOTHYROXINE SODIUM 88 UG/1
88 TABLET ORAL EVERY MORNING
Qty: 30 TABLET | Refills: 3 | DISCHARGE
Start: 2019-07-07

## 2019-07-06 RX ORDER — PANTOPRAZOLE SODIUM 40 MG/1
40 TABLET, DELAYED RELEASE ORAL
Qty: 30 TABLET | Refills: 3 | DISCHARGE
Start: 2019-07-07 | End: 2019-09-25

## 2019-07-06 RX ADMIN — AMITRIPTYLINE HYDROCHLORIDE 10 MG: 10 TABLET, FILM COATED ORAL at 08:57

## 2019-07-06 RX ADMIN — FUROSEMIDE 20 MG: 20 TABLET ORAL at 06:44

## 2019-07-06 RX ADMIN — Medication 500 UNITS: at 04:16

## 2019-07-06 RX ADMIN — IPRATROPIUM BROMIDE AND ALBUTEROL SULFATE 1 AMPULE: .5; 3 SOLUTION RESPIRATORY (INHALATION) at 09:10

## 2019-07-06 RX ADMIN — POTASSIUM CHLORIDE 20 MEQ: 20 TABLET, EXTENDED RELEASE ORAL at 08:58

## 2019-07-06 RX ADMIN — LEVOTHYROXINE SODIUM 88 MCG: 88 TABLET ORAL at 06:44

## 2019-07-06 RX ADMIN — PANTOPRAZOLE SODIUM 40 MG: 40 TABLET, DELAYED RELEASE ORAL at 06:44

## 2019-07-06 RX ADMIN — Medication 500 UNITS: at 11:17

## 2019-07-06 RX ADMIN — CITALOPRAM HYDROBROMIDE 20 MG: 20 TABLET ORAL at 08:58

## 2019-07-06 RX ADMIN — PRAVASTATIN SODIUM 80 MG: 20 TABLET ORAL at 08:59

## 2019-07-06 RX ADMIN — Medication 10 ML: at 04:16

## 2019-07-06 RX ADMIN — HYDROCODONE BITARTRATE AND HOMATROPINE METHYLBROMIDE 5 ML: 5; 1.5 SOLUTION ORAL at 04:13

## 2019-07-06 RX ADMIN — APIXABAN 10 MG: 5 TABLET, FILM COATED ORAL at 08:57

## 2019-07-06 ASSESSMENT — PAIN SCALES - GENERAL: PAINLEVEL_OUTOF10: 0

## 2019-07-15 ENCOUNTER — TELEPHONE (OUTPATIENT)
Dept: PALLATIVE CARE | Age: 84
End: 2019-07-15

## 2019-07-30 ENCOUNTER — TELEPHONE (OUTPATIENT)
Dept: PALLATIVE CARE | Age: 84
End: 2019-07-30

## 2019-07-30 NOTE — TELEPHONE ENCOUNTER
Palliative Medicine  Social Work Progress Note    Pt Name: Robin York placed call to pt's POA to discuss pt's needs and plan of care. Per POA (Momo Arriaza), pt will be discharged from [facility] on Saturday 8/3/19. Plan is for pt to return to her home and home health to follow. POA asked that this SW call pt early next week to discuss needs and set up [facility] visit. No other needs, concerns at this time per POA. Will continue to follow.

## 2019-08-03 ENCOUNTER — APPOINTMENT (OUTPATIENT)
Dept: GENERAL RADIOLOGY | Age: 84
DRG: 189 | End: 2019-08-03
Payer: MEDICARE

## 2019-08-03 ENCOUNTER — HOSPITAL ENCOUNTER (INPATIENT)
Age: 84
LOS: 3 days | Discharge: NURSING, REHAB OR LONG-TERM CARE | DRG: 189 | End: 2019-08-06
Attending: EMERGENCY MEDICINE | Admitting: INTERNAL MEDICINE
Payer: MEDICARE

## 2019-08-03 DIAGNOSIS — J96.01 ACUTE RESPIRATORY FAILURE WITH HYPOXIA (HCC): Primary | ICD-10-CM

## 2019-08-03 DIAGNOSIS — N63.0 BREAST SWELLING: ICD-10-CM

## 2019-08-03 DIAGNOSIS — J84.10 PULMONARY FIBROSIS (HCC): ICD-10-CM

## 2019-08-03 DIAGNOSIS — I27.82 CHRONIC PULMONARY EMBOLISM WITHOUT ACUTE COR PULMONALE, UNSPECIFIED PULMONARY EMBOLISM TYPE (HCC): ICD-10-CM

## 2019-08-03 LAB
AADO2: 298.2 MMHG
ANION GAP SERPL CALCULATED.3IONS-SCNC: 14 MMOL/L (ref 7–16)
ANISOCYTOSIS: ABNORMAL
B.E.: -5.1 MMOL/L (ref -3–3)
BASOPHILS ABSOLUTE: 0.06 E9/L (ref 0–0.2)
BASOPHILS RELATIVE PERCENT: 0.5 % (ref 0–2)
BUN BLDV-MCNC: 33 MG/DL (ref 8–23)
BURR CELLS: ABNORMAL
CALCIUM SERPL-MCNC: 9 MG/DL (ref 8.6–10.2)
CHLORIDE BLD-SCNC: 96 MMOL/L (ref 98–107)
CO2: 20 MMOL/L (ref 22–29)
COHB: 1.4 % (ref 0–1.5)
COMMENT: ABNORMAL
CREAT SERPL-MCNC: 1.2 MG/DL (ref 0.5–1)
CRITICAL: ABNORMAL
DATE ANALYZED: ABNORMAL
DATE OF COLLECTION: ABNORMAL
EOSINOPHILS ABSOLUTE: 0.01 E9/L (ref 0.05–0.5)
EOSINOPHILS RELATIVE PERCENT: 0.1 % (ref 0–6)
FIO2: 70 %
GFR AFRICAN AMERICAN: 52
GFR NON-AFRICAN AMERICAN: 43 ML/MIN/1.73
GLUCOSE BLD-MCNC: 106 MG/DL (ref 74–99)
HCO3: 18.7 MMOL/L (ref 22–26)
HCT VFR BLD CALC: 34.3 % (ref 34–48)
HEMOGLOBIN: 10.2 G/DL (ref 11.5–15.5)
HHB: 1.5 % (ref 0–5)
HYPOCHROMIA: ABNORMAL
IMMATURE GRANULOCYTES #: 0.12 E9/L
IMMATURE GRANULOCYTES %: 1.1 % (ref 0–5)
LAB: ABNORMAL
LYMPHOCYTES ABSOLUTE: 1.03 E9/L (ref 1.5–4)
LYMPHOCYTES RELATIVE PERCENT: 9.4 % (ref 20–42)
Lab: ABNORMAL
MCH RBC QN AUTO: 26.2 PG (ref 26–35)
MCHC RBC AUTO-ENTMCNC: 29.7 % (ref 32–34.5)
MCV RBC AUTO: 88.2 FL (ref 80–99.9)
METHB: 0.3 % (ref 0–1.5)
MODE: ABNORMAL
MONOCYTES ABSOLUTE: 0.7 E9/L (ref 0.1–0.95)
MONOCYTES RELATIVE PERCENT: 6.4 % (ref 2–12)
NEUTROPHILS ABSOLUTE: 9.06 E9/L (ref 1.8–7.3)
NEUTROPHILS RELATIVE PERCENT: 82.5 % (ref 43–80)
O2 SATURATION: 98.5 % (ref 92–98.5)
O2HB: 96.8 % (ref 94–97)
OPERATOR ID: 421
PATIENT TEMP: 37 C
PCO2: 30.8 MMHG (ref 35–45)
PDW BLD-RTO: 22 FL (ref 11.5–15)
PFO2: 2.15 MMHG/%
PH BLOOD GAS: 7.4 (ref 7.35–7.45)
PLATELET # BLD: 177 E9/L (ref 130–450)
PMV BLD AUTO: 10.2 FL (ref 7–12)
PO2: 150.3 MMHG (ref 60–100)
POIKILOCYTES: ABNORMAL
POLYCHROMASIA: ABNORMAL
POTASSIUM SERPL-SCNC: 5.2 MMOL/L (ref 3.5–5)
PRO-BNP: 9759 PG/ML (ref 0–450)
RBC # BLD: 3.89 E12/L (ref 3.5–5.5)
RI(T): 1.98
SODIUM BLD-SCNC: 130 MMOL/L (ref 132–146)
SOURCE, BLOOD GAS: ABNORMAL
TARGET CELLS: ABNORMAL
THB: 11.1 G/DL (ref 11.5–16.5)
TIME ANALYZED: 2106
TROPONIN: <0.01 NG/ML (ref 0–0.03)
WBC # BLD: 11 E9/L (ref 4.5–11.5)

## 2019-08-03 PROCEDURE — 94660 CPAP INITIATION&MGMT: CPT

## 2019-08-03 PROCEDURE — 94760 N-INVAS EAR/PLS OXIMETRY 1: CPT

## 2019-08-03 PROCEDURE — 6370000000 HC RX 637 (ALT 250 FOR IP): Performed by: INTERNAL MEDICINE

## 2019-08-03 PROCEDURE — 6360000002 HC RX W HCPCS: Performed by: INTERNAL MEDICINE

## 2019-08-03 PROCEDURE — 2580000003 HC RX 258: Performed by: EMERGENCY MEDICINE

## 2019-08-03 PROCEDURE — 36415 COLL VENOUS BLD VENIPUNCTURE: CPT

## 2019-08-03 PROCEDURE — 80048 BASIC METABOLIC PNL TOTAL CA: CPT

## 2019-08-03 PROCEDURE — 82805 BLOOD GASES W/O2 SATURATION: CPT

## 2019-08-03 PROCEDURE — 99285 EMERGENCY DEPT VISIT HI MDM: CPT

## 2019-08-03 PROCEDURE — 93005 ELECTROCARDIOGRAM TRACING: CPT | Performed by: EMERGENCY MEDICINE

## 2019-08-03 PROCEDURE — 85025 COMPLETE CBC W/AUTO DIFF WBC: CPT

## 2019-08-03 PROCEDURE — 71045 X-RAY EXAM CHEST 1 VIEW: CPT

## 2019-08-03 PROCEDURE — 2060000000 HC ICU INTERMEDIATE R&B

## 2019-08-03 PROCEDURE — 84484 ASSAY OF TROPONIN QUANT: CPT

## 2019-08-03 PROCEDURE — 83880 ASSAY OF NATRIURETIC PEPTIDE: CPT

## 2019-08-03 RX ORDER — PANTOPRAZOLE SODIUM 40 MG/1
40 TABLET, DELAYED RELEASE ORAL
Status: DISCONTINUED | OUTPATIENT
Start: 2019-08-04 | End: 2019-08-06 | Stop reason: HOSPADM

## 2019-08-03 RX ORDER — DIPHENHYDRAMINE HCL 25 MG
25 TABLET ORAL EVERY 6 HOURS PRN
COMMUNITY
End: 2019-09-25

## 2019-08-03 RX ORDER — AMITRIPTYLINE HYDROCHLORIDE 10 MG/1
10 TABLET, FILM COATED ORAL EVERY MORNING
Status: DISCONTINUED | OUTPATIENT
Start: 2019-08-04 | End: 2019-08-06 | Stop reason: HOSPADM

## 2019-08-03 RX ORDER — FUROSEMIDE 20 MG/1
20 TABLET ORAL
Status: DISCONTINUED | OUTPATIENT
Start: 2019-08-04 | End: 2019-08-06 | Stop reason: HOSPADM

## 2019-08-03 RX ORDER — METHYLPREDNISOLONE SODIUM SUCCINATE 40 MG/ML
40 INJECTION, POWDER, LYOPHILIZED, FOR SOLUTION INTRAMUSCULAR; INTRAVENOUS EVERY 8 HOURS
Status: DISCONTINUED | OUTPATIENT
Start: 2019-08-03 | End: 2019-08-06

## 2019-08-03 RX ORDER — PRAVASTATIN SODIUM 20 MG
80 TABLET ORAL DAILY
Status: DISCONTINUED | OUTPATIENT
Start: 2019-08-04 | End: 2019-08-06 | Stop reason: HOSPADM

## 2019-08-03 RX ORDER — ACETAMINOPHEN 325 MG/1
650 TABLET ORAL EVERY 4 HOURS PRN
Status: DISCONTINUED | OUTPATIENT
Start: 2019-08-03 | End: 2019-08-06 | Stop reason: HOSPADM

## 2019-08-03 RX ORDER — POTASSIUM CHLORIDE 20 MEQ/1
20 TABLET, EXTENDED RELEASE ORAL EVERY MORNING
Status: DISCONTINUED | OUTPATIENT
Start: 2019-08-04 | End: 2019-08-06 | Stop reason: HOSPADM

## 2019-08-03 RX ORDER — CITALOPRAM 20 MG/1
20 TABLET ORAL EVERY MORNING
Status: DISCONTINUED | OUTPATIENT
Start: 2019-08-04 | End: 2019-08-06 | Stop reason: HOSPADM

## 2019-08-03 RX ORDER — DIPHENHYDRAMINE HCL 25 MG
25 TABLET ORAL EVERY 6 HOURS PRN
Status: DISCONTINUED | OUTPATIENT
Start: 2019-08-03 | End: 2019-08-06 | Stop reason: HOSPADM

## 2019-08-03 RX ORDER — BENZONATATE 100 MG/1
100 CAPSULE ORAL 3 TIMES DAILY PRN
Status: DISCONTINUED | OUTPATIENT
Start: 2019-08-03 | End: 2019-08-06 | Stop reason: HOSPADM

## 2019-08-03 RX ORDER — LOPERAMIDE HYDROCHLORIDE 2 MG/1
2 CAPSULE ORAL 4 TIMES DAILY PRN
Status: DISCONTINUED | OUTPATIENT
Start: 2019-08-03 | End: 2019-08-06 | Stop reason: HOSPADM

## 2019-08-03 RX ORDER — NYSTATIN 100000 U/G
CREAM TOPICAL
COMMUNITY
End: 2019-09-25

## 2019-08-03 RX ORDER — LEVOTHYROXINE SODIUM 88 UG/1
88 TABLET ORAL EVERY MORNING
Status: DISCONTINUED | OUTPATIENT
Start: 2019-08-04 | End: 2019-08-06 | Stop reason: HOSPADM

## 2019-08-03 RX ORDER — IPRATROPIUM BROMIDE AND ALBUTEROL SULFATE 2.5; .5 MG/3ML; MG/3ML
3 SOLUTION RESPIRATORY (INHALATION)
Status: DISCONTINUED | OUTPATIENT
Start: 2019-08-04 | End: 2019-08-06 | Stop reason: HOSPADM

## 2019-08-03 RX ORDER — ACETAMINOPHEN 325 MG/1
650 TABLET ORAL EVERY 4 HOURS PRN
COMMUNITY

## 2019-08-03 RX ORDER — 0.9 % SODIUM CHLORIDE 0.9 %
1000 INTRAVENOUS SOLUTION INTRAVENOUS ONCE
Status: COMPLETED | OUTPATIENT
Start: 2019-08-03 | End: 2019-08-03

## 2019-08-03 RX ADMIN — APIXABAN 10 MG: 5 TABLET, FILM COATED ORAL at 22:06

## 2019-08-03 RX ADMIN — SODIUM CHLORIDE 1000 ML: 9 INJECTION, SOLUTION INTRAVENOUS at 18:34

## 2019-08-03 RX ADMIN — METHYLPREDNISOLONE SODIUM SUCCINATE 40 MG: 40 INJECTION, POWDER, FOR SOLUTION INTRAMUSCULAR; INTRAVENOUS at 22:07

## 2019-08-03 RX ADMIN — HYDROCODONE BITARTRATE AND HOMATROPINE METHYLBROMIDE 5 ML: 5; 1.5 SOLUTION ORAL at 22:06

## 2019-08-03 ASSESSMENT — ENCOUNTER SYMPTOMS
SORE THROAT: 0
WHEEZING: 0
BACK PAIN: 0
ABDOMINAL PAIN: 0
NAUSEA: 0
SINUS PRESSURE: 0
EYE PAIN: 0
EYE REDNESS: 0
ABDOMINAL DISTENTION: 0
SHORTNESS OF BREATH: 1
COUGH: 0
EYE DISCHARGE: 0
VOMITING: 0
DIARRHEA: 0

## 2019-08-03 ASSESSMENT — PAIN SCALES - GENERAL
PAINLEVEL_OUTOF10: 0
PAINLEVEL_OUTOF10: 0

## 2019-08-03 NOTE — ED NOTES
Bed: 10  Expected date:   Expected time:   Means of arrival:   Comments:  ems     Macy Benitez RN  08/03/19 [id number]

## 2019-08-03 NOTE — ED PROVIDER NOTES
normal. She is in respiratory distress. She has no wheezes. She has no rales. Abdominal: Soft. Bowel sounds are normal. There is no tenderness. There is no rebound and no guarding. Musculoskeletal: She exhibits edema (2+ bilateral). Neurological: She is alert and oriented to person, place, and time. No cranial nerve deficit. Coordination normal.   Skin: Skin is warm and dry. Nursing note and vitals reviewed. Procedures    MDM    ED Course as of Aug 03 2149   Sat Aug 03, 2019   1649 Patient 87% on NRB, Bipap ordered. [MF]   1730 Improved on bipap      [MF]   2014 Discussed case with Dr. Yasmin Arce - he will admit patient. [MF]      ED Course User Index  [MF] Nevin Garsia,      Resents for acute hypoxic respiratory failure likely secondary to patient's history of pulmonary fibrosis as well as recent PE Logan being treated for with Eliquis. Patient with significant improvement on BiPAP. To be admitted for further evaluation and treatment. Her pulmonologist is Dr. Mariano Weinberg.        --------------------------------------------- PAST HISTORY ---------------------------------------------  Past Medical History:  has a past medical history of Anemia, Colon cancer ([facility] [address] Hypertension, On home oxygen therapy, Pulmonary fibrosis ([facility] [address] and Thyroid disease. Past Surgical History:  has a past surgical history that includes Tonsillectomy; Hysterectomy; Appendectomy; Cholecystectomy; Rotator cuff repair; Thyroid surgery; hemicolectomy (Right, 11/12/2015); Colonoscopy; Endoscopy, colon, diagnostic; Upper gastrointestinal endoscopy (12/7/2015); other surgical history (Left, 12/7/2015); Breast lumpectomy (Right, 10/26/2018); and pr biopsy of breast, needle core (Right, 10/26/2018). Social History:  reports that she has quit smoking. She has never used smokeless tobacco. She reports that she does not drink alcohol or use drugs. Family History: family history is not on file.      The patients home vital signs as below have been reviewed. Patient Vitals for the past 24 hrs:   BP Temp Temp src Pulse Resp SpO2 Height Weight   08/03/19 2137 138/76 97.8 °F (36.6 °C) Temporal 88 16 91 % -- --   08/03/19 2106 (!) 119/91 98 °F (36.7 °C) -- 84 24 100 % -- --   08/03/19 1923 (!) 147/98 -- -- 87 24 100 % -- --   08/03/19 1714 -- -- -- -- (!) 32 -- -- --   08/03/19 1648 -- -- -- 96 (!) 32 (!) 89 % -- --   08/03/19 1638 94/70 -- -- -- 24 94 % 5' (1.524 m) 156 lb (70.8 kg)       Oxygen Saturation Interpretation: Improved after treatment    ------------------------------------------ PROGRESS NOTES ------------------------------------------  Re-evaluation(s):  Time: 1972  Patients symptoms are improving  Repeat physical examination is improved    Counseling:  I have spoken with the patient and discussed todays results, in addition to providing specific details for the plan of care and counseling regarding the diagnosis and prognosis. Their questions are answered at this time and they are agreeable with the plan of admission.    --------------------------------- ADDITIONAL PROVIDER NOTES ---------------------------------    This patient's ED course included: a personal history and physicial examination, multiple bedside re-evaluations, IV medications, cardiac monitoring, continuous pulse oximetry and complex medical decision making and emergency management    This patient has remained hemodynamically stable during their ED course. Diagnosis:  1. Acute respiratory failure with hypoxia (HCC)    2. Pulmonary fibrosis ([facility] [address]    3. Chronic pulmonary embolism without acute cor pulmonale, unspecified pulmonary embolism type (HCC)        Disposition:  Patient's disposition: Admit to telemetry  Patient's condition is stable. NOTE: This report was transcribed using voice recognition software.  Every effort was made to ensure accuracy; however, inadvertent computerized transcription errors may be present           [facility] Lavern Harden DO  Resident  08/03/19 4002

## 2019-08-04 ENCOUNTER — APPOINTMENT (OUTPATIENT)
Dept: ULTRASOUND IMAGING | Age: 84
DRG: 189 | End: 2019-08-04
Payer: MEDICARE

## 2019-08-04 LAB
EKG ATRIAL RATE: 92 BPM
EKG P AXIS: 60 DEGREES
EKG P-R INTERVAL: 182 MS
EKG Q-T INTERVAL: 368 MS
EKG QRS DURATION: 74 MS
EKG QTC CALCULATION (BAZETT): 455 MS
EKG R AXIS: 70 DEGREES
EKG T AXIS: 72 DEGREES
EKG VENTRICULAR RATE: 92 BPM

## 2019-08-04 PROCEDURE — 2700000000 HC OXYGEN THERAPY PER DAY

## 2019-08-04 PROCEDURE — 94640 AIRWAY INHALATION TREATMENT: CPT

## 2019-08-04 PROCEDURE — 93010 ELECTROCARDIOGRAM REPORT: CPT | Performed by: INTERNAL MEDICINE

## 2019-08-04 PROCEDURE — 6370000000 HC RX 637 (ALT 250 FOR IP): Performed by: INTERNAL MEDICINE

## 2019-08-04 PROCEDURE — 81400 MOPATH PROCEDURE LEVEL 1: CPT

## 2019-08-04 PROCEDURE — 6360000002 HC RX W HCPCS: Performed by: INTERNAL MEDICINE

## 2019-08-04 PROCEDURE — 2580000003 HC RX 258: Performed by: INTERNAL MEDICINE

## 2019-08-04 PROCEDURE — 81240 F2 GENE: CPT

## 2019-08-04 PROCEDURE — 2060000000 HC ICU INTERMEDIATE R&B

## 2019-08-04 PROCEDURE — 94660 CPAP INITIATION&MGMT: CPT

## 2019-08-04 PROCEDURE — 81241 F5 GENE: CPT

## 2019-08-04 PROCEDURE — 93970 EXTREMITY STUDY: CPT

## 2019-08-04 PROCEDURE — 36415 COLL VENOUS BLD VENIPUNCTURE: CPT

## 2019-08-04 PROCEDURE — 81291 MTHFR GENE: CPT

## 2019-08-04 RX ORDER — SODIUM CHLORIDE 0.9 % (FLUSH) 0.9 %
10 SYRINGE (ML) INJECTION PRN
Status: DISCONTINUED | OUTPATIENT
Start: 2019-08-04 | End: 2019-08-06 | Stop reason: HOSPADM

## 2019-08-04 RX ORDER — SODIUM CHLORIDE 0.9 % (FLUSH) 0.9 %
10 SYRINGE (ML) INJECTION 2 TIMES DAILY
Status: DISCONTINUED | OUTPATIENT
Start: 2019-08-04 | End: 2019-08-06 | Stop reason: HOSPADM

## 2019-08-04 RX ADMIN — LEVOTHYROXINE SODIUM 88 MCG: 88 TABLET ORAL at 06:18

## 2019-08-04 RX ADMIN — AMITRIPTYLINE HYDROCHLORIDE 10 MG: 10 TABLET, FILM COATED ORAL at 08:30

## 2019-08-04 RX ADMIN — METHYLPREDNISOLONE SODIUM SUCCINATE 40 MG: 40 INJECTION, POWDER, FOR SOLUTION INTRAMUSCULAR; INTRAVENOUS at 06:19

## 2019-08-04 RX ADMIN — IPRATROPIUM BROMIDE AND ALBUTEROL SULFATE 3 ML: .5; 3 SOLUTION RESPIRATORY (INHALATION) at 12:13

## 2019-08-04 RX ADMIN — IPRATROPIUM BROMIDE AND ALBUTEROL SULFATE 3 ML: .5; 3 SOLUTION RESPIRATORY (INHALATION) at 16:16

## 2019-08-04 RX ADMIN — BENZONATATE 100 MG: 100 CAPSULE ORAL at 23:04

## 2019-08-04 RX ADMIN — Medication 10 ML: at 06:20

## 2019-08-04 RX ADMIN — APIXABAN 10 MG: 5 TABLET, FILM COATED ORAL at 19:31

## 2019-08-04 RX ADMIN — DIPHENHYDRAMINE HCL 25 MG: 25 TABLET ORAL at 23:08

## 2019-08-04 RX ADMIN — METHYLPREDNISOLONE SODIUM SUCCINATE 40 MG: 40 INJECTION, POWDER, FOR SOLUTION INTRAMUSCULAR; INTRAVENOUS at 23:02

## 2019-08-04 RX ADMIN — HYDROCODONE BITARTRATE AND HOMATROPINE METHYLBROMIDE 5 ML: 5; 1.5 SOLUTION ORAL at 18:57

## 2019-08-04 RX ADMIN — HYDROCODONE BITARTRATE AND HOMATROPINE METHYLBROMIDE 5 ML: 5; 1.5 SOLUTION ORAL at 13:48

## 2019-08-04 RX ADMIN — METHYLPREDNISOLONE SODIUM SUCCINATE 40 MG: 40 INJECTION, POWDER, FOR SOLUTION INTRAMUSCULAR; INTRAVENOUS at 13:48

## 2019-08-04 RX ADMIN — Medication 10 ML: at 19:31

## 2019-08-04 RX ADMIN — PRAVASTATIN SODIUM 80 MG: 20 TABLET ORAL at 08:29

## 2019-08-04 RX ADMIN — CITALOPRAM 20 MG: 20 TABLET, FILM COATED ORAL at 08:29

## 2019-08-04 RX ADMIN — IPRATROPIUM BROMIDE AND ALBUTEROL SULFATE 3 ML: .5; 3 SOLUTION RESPIRATORY (INHALATION) at 09:03

## 2019-08-04 RX ADMIN — PANTOPRAZOLE SODIUM 40 MG: 40 TABLET, DELAYED RELEASE ORAL at 06:18

## 2019-08-04 RX ADMIN — APIXABAN 10 MG: 5 TABLET, FILM COATED ORAL at 08:33

## 2019-08-04 RX ADMIN — HYDROCODONE BITARTRATE AND HOMATROPINE METHYLBROMIDE 5 ML: 5; 1.5 SOLUTION ORAL at 02:46

## 2019-08-04 RX ADMIN — IPRATROPIUM BROMIDE AND ALBUTEROL SULFATE 3 ML: .5; 3 SOLUTION RESPIRATORY (INHALATION) at 21:01

## 2019-08-04 RX ADMIN — Medication 10 ML: at 13:48

## 2019-08-04 RX ADMIN — FUROSEMIDE 20 MG: 20 TABLET ORAL at 06:18

## 2019-08-04 ASSESSMENT — PAIN SCALES - GENERAL: PAINLEVEL_OUTOF10: 0

## 2019-08-04 NOTE — PROGRESS NOTES
After aerosol treatmen I noticed patient being SOB, placed patient on bipap 12/6 with 70% fio, patient does not like the bipap but understands that will help with her breathing.

## 2019-08-05 PROBLEM — I95.9 HYPOTENSION: Status: RESOLVED | Noted: 2019-04-24 | Resolved: 2019-08-05

## 2019-08-05 PROBLEM — Z86.718 HISTORY OF DVT (DEEP VEIN THROMBOSIS): Chronic | Status: ACTIVE | Noted: 2019-08-05

## 2019-08-05 PROBLEM — I82.A12 ACUTE DEEP VEIN THROMBOSIS (DVT) OF AXILLARY VEIN OF LEFT UPPER EXTREMITY (HCC): Status: RESOLVED | Noted: 2019-07-03 | Resolved: 2019-08-05

## 2019-08-05 PROCEDURE — 6370000000 HC RX 637 (ALT 250 FOR IP): Performed by: INTERNAL MEDICINE

## 2019-08-05 PROCEDURE — 94660 CPAP INITIATION&MGMT: CPT

## 2019-08-05 PROCEDURE — 2060000000 HC ICU INTERMEDIATE R&B

## 2019-08-05 PROCEDURE — 2580000003 HC RX 258: Performed by: INTERNAL MEDICINE

## 2019-08-05 PROCEDURE — 2700000000 HC OXYGEN THERAPY PER DAY

## 2019-08-05 PROCEDURE — 6360000002 HC RX W HCPCS: Performed by: INTERNAL MEDICINE

## 2019-08-05 PROCEDURE — 94640 AIRWAY INHALATION TREATMENT: CPT

## 2019-08-05 RX ADMIN — Medication 10 ML: at 22:09

## 2019-08-05 RX ADMIN — CITALOPRAM 20 MG: 20 TABLET, FILM COATED ORAL at 08:49

## 2019-08-05 RX ADMIN — Medication 10 ML: at 08:50

## 2019-08-05 RX ADMIN — POTASSIUM CHLORIDE 20 MEQ: 20 TABLET, EXTENDED RELEASE ORAL at 08:49

## 2019-08-05 RX ADMIN — IPRATROPIUM BROMIDE AND ALBUTEROL SULFATE 3 ML: .5; 3 SOLUTION RESPIRATORY (INHALATION) at 10:39

## 2019-08-05 RX ADMIN — APIXABAN 10 MG: 5 TABLET, FILM COATED ORAL at 08:49

## 2019-08-05 RX ADMIN — PRAVASTATIN SODIUM 80 MG: 20 TABLET ORAL at 08:48

## 2019-08-05 RX ADMIN — METHYLPREDNISOLONE SODIUM SUCCINATE 40 MG: 40 INJECTION, POWDER, FOR SOLUTION INTRAMUSCULAR; INTRAVENOUS at 14:40

## 2019-08-05 RX ADMIN — IPRATROPIUM BROMIDE AND ALBUTEROL SULFATE 3 ML: .5; 3 SOLUTION RESPIRATORY (INHALATION) at 13:51

## 2019-08-05 RX ADMIN — BENZONATATE 100 MG: 100 CAPSULE ORAL at 08:48

## 2019-08-05 RX ADMIN — APIXABAN 10 MG: 5 TABLET, FILM COATED ORAL at 22:07

## 2019-08-05 RX ADMIN — HYDROCODONE BITARTRATE AND HOMATROPINE METHYLBROMIDE 5 ML: 5; 1.5 SOLUTION ORAL at 11:14

## 2019-08-05 RX ADMIN — LEVOTHYROXINE SODIUM 88 MCG: 88 TABLET ORAL at 05:41

## 2019-08-05 RX ADMIN — AMITRIPTYLINE HYDROCHLORIDE 10 MG: 10 TABLET, FILM COATED ORAL at 08:49

## 2019-08-05 RX ADMIN — PANTOPRAZOLE SODIUM 40 MG: 40 TABLET, DELAYED RELEASE ORAL at 05:41

## 2019-08-05 RX ADMIN — IPRATROPIUM BROMIDE AND ALBUTEROL SULFATE 3 ML: .5; 3 SOLUTION RESPIRATORY (INHALATION) at 20:12

## 2019-08-05 RX ADMIN — HYDROCODONE BITARTRATE AND HOMATROPINE METHYLBROMIDE 5 ML: 5; 1.5 SOLUTION ORAL at 01:27

## 2019-08-05 RX ADMIN — IPRATROPIUM BROMIDE AND ALBUTEROL SULFATE 3 ML: .5; 3 SOLUTION RESPIRATORY (INHALATION) at 16:52

## 2019-08-05 RX ADMIN — HYDROCODONE BITARTRATE AND HOMATROPINE METHYLBROMIDE 5 ML: 5; 1.5 SOLUTION ORAL at 22:07

## 2019-08-05 RX ADMIN — METHYLPREDNISOLONE SODIUM SUCCINATE 40 MG: 40 INJECTION, POWDER, FOR SOLUTION INTRAMUSCULAR; INTRAVENOUS at 22:07

## 2019-08-05 RX ADMIN — METHYLPREDNISOLONE SODIUM SUCCINATE 40 MG: 40 INJECTION, POWDER, FOR SOLUTION INTRAMUSCULAR; INTRAVENOUS at 05:42

## 2019-08-05 RX ADMIN — FUROSEMIDE 20 MG: 20 TABLET ORAL at 05:41

## 2019-08-05 ASSESSMENT — PAIN SCALES - GENERAL
PAINLEVEL_OUTOF10: 0

## 2019-08-05 NOTE — PROGRESS NOTES
Patient does not want to wear bipap at this time. She did not wear last night. She did however wear it from [id number] today. Patient educated about this and how bipap will help her respiratory status .  She states she will wear later today and tonight

## 2019-08-06 VITALS
BODY MASS INDEX: 30.63 KG/M2 | OXYGEN SATURATION: 94 % | SYSTOLIC BLOOD PRESSURE: 160 MMHG | WEIGHT: 156 LBS | TEMPERATURE: 98 F | DIASTOLIC BLOOD PRESSURE: 78 MMHG | HEART RATE: 103 BPM | HEIGHT: 60 IN | RESPIRATION RATE: 28 BRPM

## 2019-08-06 PROCEDURE — 6360000002 HC RX W HCPCS: Performed by: INTERNAL MEDICINE

## 2019-08-06 PROCEDURE — 6370000000 HC RX 637 (ALT 250 FOR IP): Performed by: INTERNAL MEDICINE

## 2019-08-06 PROCEDURE — 99221 1ST HOSP IP/OBS SF/LOW 40: CPT | Performed by: EMERGENCY MEDICINE

## 2019-08-06 PROCEDURE — 94640 AIRWAY INHALATION TREATMENT: CPT

## 2019-08-06 PROCEDURE — 2700000000 HC OXYGEN THERAPY PER DAY

## 2019-08-06 PROCEDURE — 94660 CPAP INITIATION&MGMT: CPT

## 2019-08-06 PROCEDURE — 2580000003 HC RX 258: Performed by: INTERNAL MEDICINE

## 2019-08-06 RX ORDER — PREDNISONE 20 MG/1
40 TABLET ORAL DAILY
Status: DISCONTINUED | OUTPATIENT
Start: 2019-08-06 | End: 2019-08-06 | Stop reason: HOSPADM

## 2019-08-06 RX ORDER — PREDNISONE 10 MG/1
TABLET ORAL
Qty: 20 TABLET | Refills: 0 | Status: SHIPPED | OUTPATIENT
Start: 2019-08-06 | End: 2019-09-25

## 2019-08-06 RX ADMIN — PREDNISONE 40 MG: 20 TABLET ORAL at 12:27

## 2019-08-06 RX ADMIN — IPRATROPIUM BROMIDE AND ALBUTEROL SULFATE 3 ML: .5; 3 SOLUTION RESPIRATORY (INHALATION) at 12:38

## 2019-08-06 RX ADMIN — APIXABAN 10 MG: 5 TABLET, FILM COATED ORAL at 09:24

## 2019-08-06 RX ADMIN — PRAVASTATIN SODIUM 80 MG: 20 TABLET ORAL at 09:24

## 2019-08-06 RX ADMIN — CITALOPRAM 20 MG: 20 TABLET, FILM COATED ORAL at 09:24

## 2019-08-06 RX ADMIN — METHYLPREDNISOLONE SODIUM SUCCINATE 40 MG: 40 INJECTION, POWDER, FOR SOLUTION INTRAMUSCULAR; INTRAVENOUS at 06:35

## 2019-08-06 RX ADMIN — AMITRIPTYLINE HYDROCHLORIDE 10 MG: 10 TABLET, FILM COATED ORAL at 09:24

## 2019-08-06 RX ADMIN — LEVOTHYROXINE SODIUM 88 MCG: 88 TABLET ORAL at 06:35

## 2019-08-06 RX ADMIN — PANTOPRAZOLE SODIUM 40 MG: 40 TABLET, DELAYED RELEASE ORAL at 06:35

## 2019-08-06 RX ADMIN — POTASSIUM CHLORIDE 20 MEQ: 20 TABLET, EXTENDED RELEASE ORAL at 09:24

## 2019-08-06 RX ADMIN — Medication 10 ML: at 06:35

## 2019-08-06 RX ADMIN — HYDROCODONE BITARTRATE AND HOMATROPINE METHYLBROMIDE 5 ML: 5; 1.5 SOLUTION ORAL at 06:49

## 2019-08-06 RX ADMIN — FUROSEMIDE 20 MG: 20 TABLET ORAL at 06:35

## 2019-08-06 ASSESSMENT — ENCOUNTER SYMPTOMS
NAUSEA: 0
DIARRHEA: 0
WHEEZING: 1
CONSTIPATION: 0
ABDOMINAL PAIN: 0
VOMITING: 0
SHORTNESS OF BREATH: 1
COUGH: 1
SINUS PAIN: 0

## 2019-08-06 ASSESSMENT — PAIN SCALES - GENERAL: PAINLEVEL_OUTOF10: 0

## 2019-08-06 NOTE — PROGRESS NOTES
Date: 8/6/2019    Time: 4:04 AM    Patient Placed On BIPAP/CPAP/ Non-Invasive Ventilation? No    If no must comment. Facial area red/color change? No           If YES are Blister/Lesion present? No   If yes must notify nursing staff  BIPAP/CPAP skin barrier?   Yes    Skin barrier type:Liquicel       Comments:    Pt wearing bipap from previous shift    Mamadou Lerma

## 2019-08-06 NOTE — CONSULTS
Thyroid disease        Past Surgical History:   Procedure Laterality Date    APPENDECTOMY      BREAST LUMPECTOMY Right 10/26/2018    with Dr. Marylee Alamin, COLON, DIAGNOSTIC      HEMICOLECTOMY Right 11/12/2015    lysis of adhesions    HYSTERECTOMY      OTHER SURGICAL HISTORY Left 12/7/2015    insertion of mediport left    [place] BIOPSY OF BREAST, NEEDLE CORE Right 10/26/2018    NEEDLE LOCALIZATION RIGHT BREAST LUMPECTOMY WITH SENTINEL NODE, BLUE DYE INJECTION WITH  NEOPROBE AND TRIDENT performed by Filomena Reno MD at [address] [place] [address]    TONSILLECTOMY      UPPER GASTROINTESTINAL ENDOSCOPY  12/7/2015       History reviewed. No pertinent family history. Unable to obtain family history due to N/A- family history available    Allergies   Allergen Reactions    Penicillins Itching and Swelling    Aspirin Nausea And Vomiting       Review of Systems   Constitutional: Positive for fatigue. Negative for chills and fever. HENT: Negative for congestion and sinus pain. Respiratory: Positive for cough, shortness of breath and wheezing. Cardiovascular: Positive for leg swelling. Negative for chest pain and palpitations. Gastrointestinal: Negative for abdominal pain, constipation, diarrhea, nausea and vomiting. Abdominal swelling/edema on left     Genitourinary: Negative for dysuria and frequency. Musculoskeletal: Negative for myalgias. Skin: Negative for rash. Allergic/Immunologic: Negative for environmental allergies. Neurological: Positive for dizziness and weakness. Negative for headaches. Hematological: Does not bruise/bleed easily. Psychiatric/Behavioral: Negative for suicidal ideas.   :   See palliative care ROS/ESAS below; see HPI. All Review of Systems are Negative except as stated in the HPI. Social history:  [place] status: no  Marital status:    Living status:

## 2019-08-08 LAB — THROMBOPHILIA DNA ASSAY: NORMAL

## 2019-08-09 ENCOUNTER — HOSPITAL ENCOUNTER (OUTPATIENT)
Dept: GENERAL RADIOLOGY | Age: 84
Discharge: HOME | End: 2019-08-11
Payer: MEDICARE

## 2019-08-09 DIAGNOSIS — N63.0 BREAST SWELLING: Primary | ICD-10-CM

## 2019-08-09 DIAGNOSIS — N64.59 OTHER SIGNS AND SYMPTOMS IN BREAST: ICD-10-CM

## 2019-08-09 DIAGNOSIS — N63.0 BREAST SWELLING: ICD-10-CM

## 2019-08-09 PROCEDURE — 77066 DX MAMMO INCL CAD BI: CPT

## 2019-08-09 PROCEDURE — 76642 ULTRASOUND BREAST LIMITED: CPT

## 2019-08-13 NOTE — DISCHARGE SUMMARY
Physician Discharge Summary     Patient ID:  [facility]  [id number]  74 y.o.  1935    Admit date: 7/3/2019    Discharge date and time: 7/6/2019 11:38 AM     Admission Diagnoses: Acute deep vein thrombosis (DVT) of axillary vein of left upper extremity ([facility] [address] [O61. A12]  Acute deep vein thrombosis (DVT) of axillary vein of left upper extremity (HCC) Phineas.Peto. A12]    Discharge Diagnoses: Catheter-related DVT left arm. Cecal cancer (HCC)    Pulmonary fibrosis (HCC)    Essential hypertension    Acquired hypothyroidism    High anion gap metabolic acidosis    Lactic acidosis    Gastroenteritis    Acute kidney injury ([facility] [address]    Malignant neoplasm of upper-outer quadrant of right breast in female, estrogen receptor negative (HCC)    Hypotension    Acute deep vein thrombosis (DVT) of axillary vein of left upper extremity ([facility] [address]      Consults: pulmonary/intensive care, hematology/oncology and vascular surgery    Procedures: None    Hospital Course: Over the past 4-5 days, noted left upper extremity swelling worsening in nature, but, not associated with much pain. Upon ER presentation, attributed to subclavian, brachia, ulnar and radial veins. Lovenox started and there has been significant improvement overnight with associated gravitational drainage. 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 Most recently over the past 3-4 weeks, pt had noticed significant lower ext swelling ultimately attributed to Herceptin tx that improved after its discontinuation. Impression after evaluation was-DVT secondary to Mediport catheter.      Therapeutic Lovenox subsequently changed to NOAC and discharged to [facility] for rehab as she was too weak to go home          Discharge Exam:  See progress note from today    Disposition: [facility]    Condition:  Fair    Patient Instructions:   Discharge Medication List as of 7/6/2019 11:05 AM      START taking these medications    Details   ipratropium-albuterol (DUONEB) 0.5-2.5 (3) MG/3ML SOLN nebulizer solution Inhale 3 mLs into the lungs every 4 hours (while awake), Disp-360 mLDC to SNF      apixaban (ELIQUIS) 5 MG TABS tablet Take 2 tablets by mouth 2 times daily, Disp-60 tabletDC to SNF      HYDROcodone-homatropine (HYCODAN) 5-1.5 MG/5ML syrup Take 5 mLs by mouth every 4 hours as needed (cough) for up to 14 days. , R-0DC to SNF      pantoprazole (PROTONIX) 40 MG tablet Take 1 tablet by mouth every morning (before breakfast), Disp-30 tablet, R-3DC to [facility]         CONTINUE these medications which have CHANGED    Details   levothyroxine (SYNTHROID) 88 MCG tablet Take 1 tablet by mouth every morning, Disp-30 tablet, R-3DC to [facility]         CONTINUE these medications which have NOT CHANGED    Details   furosemide (LASIX) 20 MG tablet Take 20 mg by mouth every morning (before breakfast) Historical Med      benzonatate (TESSALON) 100 MG capsule Take 100 mg by mouth 3 times daily as needed for CoughHistorical Med      loperamide (IMODIUM) 2 MG capsule Take 2 mg by mouth 4 times daily as needed for DiarrheaHistorical Med      amitriptyline (ELAVIL) 10 MG tablet Take 10 mg by mouth every morning Historical Med      pravastatin (PRAVACHOL) 80 MG tablet Take 80 mg by mouth every morning Historical Med      potassium chloride SA (KLOR-CON M20) 20 MEQ tablet Take 20 mEq by mouth every morning Historical Med      citalopram (CELEXA) 20 MG tablet Take 20 mg by mouth every morning Historical Med         STOP taking these medications       omeprazole (PRILOSEC) 20 MG capsule Comments:   Reason for Stopping:         OXYGEN Comments:   Reason for Stopping:             Activity: activity as tolerated  Diet: cardiac diet    Follow-up with Dr. Tilda Boast in

## 2019-08-26 ENCOUNTER — TELEPHONE (OUTPATIENT)
Dept: PALLATIVE CARE | Age: 84
End: 2019-08-26

## 2019-09-03 ENCOUNTER — TELEPHONE (OUTPATIENT)
Dept: PALLATIVE CARE | Age: 84
End: 2019-09-03

## 2019-09-10 ENCOUNTER — TELEPHONE (OUTPATIENT)
Dept: PALLATIVE CARE | Age: 84
End: 2019-09-10

## 2019-09-10 NOTE — TELEPHONE ENCOUNTER
Palliative Medicine  Social Work Progress Note    Pt Name: Sonido Bowman    Referral received from pulmonology CURTIS Lin. Called pt to discuss setting up another appointment with [facility]-based palliative care; pt is agreeable.  Appointment set for in-home visit 9/25/19 at 11am.

## 2019-09-25 ENCOUNTER — CLINICAL DOCUMENTATION (OUTPATIENT)
Dept: PALLATIVE CARE | Age: 84
End: 2019-09-25

## 2019-09-25 ENCOUNTER — OFFICE VISIT (OUTPATIENT)
Dept: PALLATIVE CARE | Age: 84
End: 2019-09-25
Payer: MEDICARE

## 2019-09-25 VITALS
RESPIRATION RATE: 18 BRPM | HEART RATE: 86 BPM | SYSTOLIC BLOOD PRESSURE: 96 MMHG | DIASTOLIC BLOOD PRESSURE: 62 MMHG | OXYGEN SATURATION: 89 %

## 2019-09-25 DIAGNOSIS — R06.02 SHORTNESS OF BREATH: ICD-10-CM

## 2019-09-25 DIAGNOSIS — C50.411 MALIGNANT NEOPLASM OF UPPER-OUTER QUADRANT OF RIGHT BREAST IN FEMALE, ESTROGEN RECEPTOR NEGATIVE (HCC): ICD-10-CM

## 2019-09-25 DIAGNOSIS — Z17.1 MALIGNANT NEOPLASM OF UPPER-OUTER QUADRANT OF RIGHT BREAST IN FEMALE, ESTROGEN RECEPTOR NEGATIVE (HCC): ICD-10-CM

## 2019-09-25 DIAGNOSIS — R53.83 FATIGUE, UNSPECIFIED TYPE: ICD-10-CM

## 2019-09-25 DIAGNOSIS — J84.10 PULMONARY FIBROSIS (HCC): ICD-10-CM

## 2019-09-25 DIAGNOSIS — Z71.89 GOALS OF CARE, COUNSELING/DISCUSSION: ICD-10-CM

## 2019-09-25 DIAGNOSIS — Z51.5 PALLIATIVE CARE BY SPECIALIST: Primary | ICD-10-CM

## 2019-09-25 PROCEDURE — 1036F TOBACCO NON-USER: CPT | Performed by: NURSE PRACTITIONER

## 2019-09-25 PROCEDURE — G8417 CALC BMI ABV UP PARAM F/U: HCPCS | Performed by: NURSE PRACTITIONER

## 2019-09-25 PROCEDURE — 1090F PRES/ABSN URINE INCON ASSESS: CPT | Performed by: NURSE PRACTITIONER

## 2019-09-25 PROCEDURE — 4040F PNEUMOC VAC/ADMIN/RCVD: CPT | Performed by: NURSE PRACTITIONER

## 2019-09-25 PROCEDURE — 99350 HOME/RES VST EST HIGH MDM 60: CPT | Performed by: NURSE PRACTITIONER

## 2019-09-25 PROCEDURE — 1123F ACP DISCUSS/DSCN MKR DOCD: CPT | Performed by: NURSE PRACTITIONER

## 2019-09-25 RX ORDER — OMEPRAZOLE 20 MG/1
20 CAPSULE, DELAYED RELEASE ORAL DAILY
COMMUNITY

## 2019-10-07 ENCOUNTER — TELEPHONE (OUTPATIENT)
Dept: PALLATIVE CARE | Age: 84
End: 2019-10-07

## 2019-10-10 ENCOUNTER — TELEPHONE (OUTPATIENT)
Dept: PALLATIVE CARE | Age: 84
End: 2019-10-10

## 2019-10-21 ENCOUNTER — OFFICE VISIT (OUTPATIENT)
Dept: PALLATIVE CARE | Age: 84
End: 2019-10-21
Payer: MEDICARE

## 2019-10-21 VITALS
OXYGEN SATURATION: 87 % | SYSTOLIC BLOOD PRESSURE: 104 MMHG | HEART RATE: 79 BPM | DIASTOLIC BLOOD PRESSURE: 62 MMHG | RESPIRATION RATE: 18 BRPM

## 2019-10-21 DIAGNOSIS — Z51.5 PALLIATIVE CARE BY SPECIALIST: Primary | ICD-10-CM

## 2019-10-21 DIAGNOSIS — J84.10 PULMONARY FIBROSIS (HCC): ICD-10-CM

## 2019-10-21 DIAGNOSIS — R53.83 FATIGUE, UNSPECIFIED TYPE: ICD-10-CM

## 2019-10-21 DIAGNOSIS — R06.02 SHORTNESS OF BREATH: ICD-10-CM

## 2019-10-21 PROCEDURE — 4040F PNEUMOC VAC/ADMIN/RCVD: CPT | Performed by: NURSE PRACTITIONER

## 2019-10-21 PROCEDURE — 1123F ACP DISCUSS/DSCN MKR DOCD: CPT | Performed by: NURSE PRACTITIONER

## 2019-10-21 PROCEDURE — G8417 CALC BMI ABV UP PARAM F/U: HCPCS | Performed by: NURSE PRACTITIONER

## 2019-10-21 PROCEDURE — 1090F PRES/ABSN URINE INCON ASSESS: CPT | Performed by: NURSE PRACTITIONER

## 2019-10-21 PROCEDURE — 99349 HOME/RES VST EST MOD MDM 40: CPT | Performed by: NURSE PRACTITIONER

## 2019-10-21 PROCEDURE — 1036F TOBACCO NON-USER: CPT | Performed by: NURSE PRACTITIONER

## 2019-10-21 PROCEDURE — G8484 FLU IMMUNIZE NO ADMIN: HCPCS | Performed by: NURSE PRACTITIONER

## 2019-10-21 RX ORDER — PREDNISONE 20 MG/1
20 TABLET ORAL DAILY
Qty: 7 TABLET | Refills: 0 | Status: SHIPPED | OUTPATIENT
Start: 2019-10-21 | End: 2019-10-28

## 2019-10-31 ENCOUNTER — TELEPHONE (OUTPATIENT)
Dept: PALLATIVE CARE | Age: 84
End: 2019-10-31

## 2019-11-07 ENCOUNTER — CLINICAL DOCUMENTATION (OUTPATIENT)
Dept: PALLATIVE CARE | Age: 84
End: 2019-11-07

## 2019-11-08 DIAGNOSIS — J96.01 ACUTE RESPIRATORY FAILURE WITH HYPOXIA (HCC): ICD-10-CM

## 2019-11-08 DIAGNOSIS — Z17.1 MALIGNANT NEOPLASM OF UPPER-OUTER QUADRANT OF RIGHT BREAST IN FEMALE, ESTROGEN RECEPTOR NEGATIVE (HCC): Primary | ICD-10-CM

## 2019-11-08 DIAGNOSIS — C50.411 MALIGNANT NEOPLASM OF UPPER-OUTER QUADRANT OF RIGHT BREAST IN FEMALE, ESTROGEN RECEPTOR NEGATIVE (HCC): Primary | ICD-10-CM

## 2019-11-27 ENCOUNTER — CLINICAL DOCUMENTATION (OUTPATIENT)
Dept: PALLATIVE CARE | Age: 84
End: 2019-11-27

## 2019-11-27 ENCOUNTER — OFFICE VISIT (OUTPATIENT)
Dept: PALLATIVE CARE | Age: 84
End: 2019-11-27
Payer: MEDICARE

## 2019-11-27 VITALS
HEART RATE: 76 BPM | SYSTOLIC BLOOD PRESSURE: 112 MMHG | OXYGEN SATURATION: 86 % | DIASTOLIC BLOOD PRESSURE: 62 MMHG | RESPIRATION RATE: 18 BRPM

## 2019-11-27 DIAGNOSIS — Z51.5 PALLIATIVE CARE BY SPECIALIST: Primary | ICD-10-CM

## 2019-11-27 DIAGNOSIS — I50.30 DIASTOLIC HEART FAILURE, UNSPECIFIED HF CHRONICITY (HCC): ICD-10-CM

## 2019-11-27 DIAGNOSIS — R06.02 SHORTNESS OF BREATH: ICD-10-CM

## 2019-11-27 DIAGNOSIS — J84.10 PULMONARY FIBROSIS (HCC): ICD-10-CM

## 2019-11-27 PROCEDURE — G8417 CALC BMI ABV UP PARAM F/U: HCPCS | Performed by: NURSE PRACTITIONER

## 2019-11-27 PROCEDURE — 1090F PRES/ABSN URINE INCON ASSESS: CPT | Performed by: NURSE PRACTITIONER

## 2019-11-27 PROCEDURE — 1036F TOBACCO NON-USER: CPT | Performed by: NURSE PRACTITIONER

## 2019-11-27 PROCEDURE — 99349 HOME/RES VST EST MOD MDM 40: CPT | Performed by: NURSE PRACTITIONER

## 2019-11-27 PROCEDURE — G8484 FLU IMMUNIZE NO ADMIN: HCPCS | Performed by: NURSE PRACTITIONER

## 2019-11-27 PROCEDURE — 1123F ACP DISCUSS/DSCN MKR DOCD: CPT | Performed by: NURSE PRACTITIONER

## 2019-11-27 PROCEDURE — 4040F PNEUMOC VAC/ADMIN/RCVD: CPT | Performed by: NURSE PRACTITIONER

## 2019-11-27 RX ORDER — MORPHINE SULFATE 20 MG/ML
2.5 SOLUTION ORAL
Qty: 20 SYRINGE | Refills: 0 | Status: SHIPPED | OUTPATIENT
Start: 2019-11-27 | End: 2019-12-02 | Stop reason: SDUPTHER

## 2019-12-02 DIAGNOSIS — Z51.5 PALLIATIVE CARE BY SPECIALIST: ICD-10-CM

## 2019-12-02 DIAGNOSIS — R06.02 SHORTNESS OF BREATH: ICD-10-CM

## 2019-12-02 DIAGNOSIS — J84.10 PULMONARY FIBROSIS (HCC): ICD-10-CM

## 2019-12-02 DIAGNOSIS — I50.30 DIASTOLIC HEART FAILURE, UNSPECIFIED HF CHRONICITY (HCC): ICD-10-CM

## 2019-12-02 RX ORDER — MORPHINE SULFATE 20 MG/ML
2.5 SOLUTION ORAL
Qty: 20 SYRINGE | Refills: 0 | Status: SHIPPED | OUTPATIENT
Start: 2019-12-02 | End: 2019-12-05 | Stop reason: SDUPTHER

## 2019-12-03 ENCOUNTER — TELEPHONE (OUTPATIENT)
Dept: PALLATIVE CARE | Age: 84
End: 2019-12-03

## 2019-12-05 DIAGNOSIS — J84.10 PULMONARY FIBROSIS (HCC): ICD-10-CM

## 2019-12-05 DIAGNOSIS — R06.02 SHORTNESS OF BREATH: ICD-10-CM

## 2019-12-05 DIAGNOSIS — I50.30 DIASTOLIC HEART FAILURE, UNSPECIFIED HF CHRONICITY (HCC): ICD-10-CM

## 2019-12-05 DIAGNOSIS — Z51.5 PALLIATIVE CARE BY SPECIALIST: ICD-10-CM

## 2019-12-05 RX ORDER — MORPHINE SULFATE 20 MG/ML
2.5 SOLUTION ORAL
Qty: 1 BOTTLE | Refills: 0 | Status: SHIPPED | OUTPATIENT
Start: 2019-12-05 | End: 2020-01-04

## 2019-12-11 ENCOUNTER — OFFICE VISIT (OUTPATIENT)
Dept: PALLATIVE CARE | Age: 84
End: 2019-12-11
Payer: MEDICARE

## 2019-12-11 VITALS — HEART RATE: 98 BPM | OXYGEN SATURATION: 75 % | RESPIRATION RATE: 18 BRPM

## 2019-12-11 DIAGNOSIS — R06.02 SHORTNESS OF BREATH: ICD-10-CM

## 2019-12-11 DIAGNOSIS — J96.01 ACUTE RESPIRATORY FAILURE WITH HYPOXIA (HCC): ICD-10-CM

## 2019-12-11 DIAGNOSIS — Z51.5 PALLIATIVE CARE BY SPECIALIST: Primary | ICD-10-CM

## 2019-12-11 DIAGNOSIS — Z71.89 GOALS OF CARE, COUNSELING/DISCUSSION: ICD-10-CM

## 2019-12-11 DIAGNOSIS — J84.10 PULMONARY FIBROSIS (HCC): ICD-10-CM

## 2019-12-11 PROCEDURE — 1036F TOBACCO NON-USER: CPT | Performed by: NURSE PRACTITIONER

## 2019-12-11 PROCEDURE — 1123F ACP DISCUSS/DSCN MKR DOCD: CPT | Performed by: NURSE PRACTITIONER

## 2019-12-11 PROCEDURE — G8417 CALC BMI ABV UP PARAM F/U: HCPCS | Performed by: NURSE PRACTITIONER

## 2019-12-11 PROCEDURE — 1090F PRES/ABSN URINE INCON ASSESS: CPT | Performed by: NURSE PRACTITIONER

## 2019-12-11 PROCEDURE — 4040F PNEUMOC VAC/ADMIN/RCVD: CPT | Performed by: NURSE PRACTITIONER

## 2019-12-11 PROCEDURE — 99349 HOME/RES VST EST MOD MDM 40: CPT | Performed by: NURSE PRACTITIONER

## 2019-12-11 PROCEDURE — G8484 FLU IMMUNIZE NO ADMIN: HCPCS | Performed by: NURSE PRACTITIONER

## 2019-12-26 ENCOUNTER — TELEPHONE (OUTPATIENT)
Dept: PALLATIVE CARE | Age: 84
End: 2019-12-26

## 2019-12-27 ENCOUNTER — TELEPHONE (OUTPATIENT)
Dept: PALLATIVE CARE | Age: 84
End: 2019-12-27

## 2020-01-15 ENCOUNTER — OFFICE VISIT (OUTPATIENT)
Dept: PALLATIVE CARE | Age: 85
End: 2020-01-15
Payer: MEDICARE

## 2020-01-15 VITALS — HEART RATE: 103 BPM | OXYGEN SATURATION: 87 % | DIASTOLIC BLOOD PRESSURE: 48 MMHG | SYSTOLIC BLOOD PRESSURE: 88 MMHG

## 2020-01-15 PROCEDURE — G8417 CALC BMI ABV UP PARAM F/U: HCPCS | Performed by: NURSE PRACTITIONER

## 2020-01-15 PROCEDURE — 1036F TOBACCO NON-USER: CPT | Performed by: NURSE PRACTITIONER

## 2020-01-15 PROCEDURE — 1123F ACP DISCUSS/DSCN MKR DOCD: CPT | Performed by: NURSE PRACTITIONER

## 2020-01-15 PROCEDURE — 99349 HOME/RES VST EST MOD MDM 40: CPT | Performed by: NURSE PRACTITIONER

## 2020-01-15 PROCEDURE — 4040F PNEUMOC VAC/ADMIN/RCVD: CPT | Performed by: NURSE PRACTITIONER

## 2020-01-15 PROCEDURE — G8484 FLU IMMUNIZE NO ADMIN: HCPCS | Performed by: NURSE PRACTITIONER

## 2020-01-15 PROCEDURE — 1090F PRES/ABSN URINE INCON ASSESS: CPT | Performed by: NURSE PRACTITIONER

## 2020-01-15 NOTE — PROGRESS NOTES
with any questions, concerns, needs, or changes in symptoms. Subjective:     HPI:  Marleni Broussard is a 81 y.o. 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 Espinoza [place], on herceptin, which was recently started, also with history of chronic respiratory failure, pulmonary fibrosis/COPD, followed by Dr. Sandip Anne. She was referred to [address] [place] [address] to assist with goals of care and symptom management. Subjective/Events/Discussions:  6/27/19  Mrs. Franceen Nyhan is seen in her home today, no family present. I am accompanied by the palliative medicine license . Introduced palliative medicine, discussed the role of medicine could play in her care. This includes discussion regarding her life limiting illness, psychosocial support, goals of care, and symptom management. Mrs. Franceen Nyhan is alert, oriented, and able to voice her needs and concerns well. Currently she complains of shortness of breath with exertion, which improves with rest.  This is associated with a dry nonproductive cough, and has been present for the past 2 months. Today she also complains of bilateral lower extremity swelling, which began approximately 1 month ago, and she was prescribed Lasix by her PCP. She was recently seen by her oncology team, who stopped her to Herceptin, ordered a chest x-ray, as well as an echocardiogram.  Goals of care, her goal is to remain at home, and preserve her independence, as well as live longer with a good quality of life. Regarding resuscitation, she expresses that she would not wish to be resuscitated, and also would not wish to be intubated/mechanically ventilated. She currently resides alone, but does receive assistance within her home from her sister-in-law, as well as a niece. Resources were discussed, and referral to the Medicare care choice program will be made, as well as contact with the local [facility] agency on aging. Options of care were discussed, plan is as detailed above. She is encouraged to maintain her current level activity, as well as to increase her activity as tolerated. She is encouraged to elevate her legs often, and to take her medication as prescribed. Regarding her shortness of breath, she is encouraged to use nonpharmacological strategies as we discussed, and if this worsens, we will further consider the addition of low-dose oral morphine. She is appreciative of the support provided, will plan to follow-up with her in approximately 4 weeks to reassess. 9/25/2019:  Kenneth Reddy is seen in her home today, no family or caregivers present, and I am accompanied by the palliative medicine license . She is alert, oriented, able to voice her needs and concerns well, does not show any signs of sedation and or confusion. She continues to be a very good historian, and we review the past few months, which have included hospitalizations, rehabilitation, as well as [facility]. She has been home now for the past few weeks, and overall is doing well. She is being followed by home health care, receiving assistance with bathing, physical/occupational therapy, she states that her skilled nursing has distended. She states that she is also told that her personal care aide assistance will also be ending soon, as well as therapy. Her license  will try and coordinate further in-home assistance, via the [facility] agency on aging/[employer] services. We will also try to establish follow-up with the Medicare care choice program, which would also likely be beneficial for her. Her #1 complaint is that of shortness of breath with exertion.   This shortness of breath was observed upon my arrival, and she was noticeably short of breath after she let me into the home. Her O2 saturation at that time was noted to be as low as 66%, she is wearing her oxygen 5 L/min at that time, she rested, we monitored her O2 saturation, and it relatively quickly recovered to a level of 88 to 90%, shortness of breath improved. She is now utilizing supplemental oxygen at 5 L/min, and she has a trilogy noninvasive ventilator which she uses nightly, and on occasion during the day. She states to be become short of breath with mild exertion, but she states she recovers quickly with rest.  She has a pulse oximeter, which she keeps near her, and she will check her oxygenation frequently when she is short of breath, and she will rest while monitoring her oxygenation further until she recovers. New complaint is that of fatigue, which she states is primarily exacerbated by her shortness of breath. She states that she is used to being \"in energizer bunny\", but now that she feels like a \"funny without the battery\". She does continue to perform most of her activities of daily living independently, and she continues to make her wishes known that she desires to stay within her home and not pursue any types of placement. Her wishes as we discussed from her previous visit have remained the same, regarding resuscitation, mechanical ventilation, and placement. She does not wish to pursue hospice care at this time, but she states she is happy to our service to continue to follow, provide support, and assist with symptom management moving forward as needed. We will make no changes to her current plan of care at this time, but will work towards coordinating further ancillary services to make sure she has the resources in her home to provide her the assistance she needs. She is very appreciative of our support, and will continue to follow along closely. She is encouraged to call us if she has any questions, concerns, or changes in her symptoms.     10/21/2019:  Cristina Bahena is seen again today in her home. She is alert and oriented and appears to be at her baseline. She continues to be short of breath, and she states that she feels this has worsened over the past week. She was again SOB after letting me and and again hypoxic, but did recover after a few minutes of rest.  She continues to use 5 L/min of O2 and is using her nebulizer as order, as well as her NIV at night. She expresses her frustration with her breathing difficulty and the effect it has continued to cause on her quality of life. We discussed strategies for managing her SOB at great length. We will prescribe a burst of steroids for her mild exacerbation and follow up soon regarding efficacy. 11/27/2019:  Mariel Medina is seen again today in her home, and I am accompanied by the palliative medicine license . She continues to have significant shortness of breath, which she states has worsened, and she continues to have a difficult time recovering, even with rest.  She states this is significantly worse as she performs ADLs, especially bathing/showering, even though she does have assistance from a personal care aide. We discussed management options of this dyspnea, including the use of low-dose oral morphine, indication, risks and benefits of use,, and she states that she would like to try this. Provided instruction on how to utilize this medication, when to utilize this medication, and we will try and coordinate with the nurse for the Medicare care choice program to provide further education for the patient. We did spend much of our time today discussing her goals of care again, including a potential transition to hospice care, potentially in the near future, and she is open to this conversation topic, but is not prepared for transition to hospice care at this time.   Much time was spent providing support, we will continue to follow along provide ongoing support regarding her goals of care, coronation of care, and symptomatic management. 12/11/2019:  Cristina Bahena is seen in her home today, no family present. She is alert, oriented, able voice needs concerns well, does not show any signs sedation or confusion. She continues to have severe shortness of breath, and she states that she recently was seen by her oncology team, told that she had low blood count. We discussed that it is likely that if she is anemic, this would further exacerbate her shortness of breath, and she should follow-up with her oncology team in the near future regarding potential need for blood transfusion. She otherwise denies any other new symptoms, states though she feels her shortness of breath is worsening. She has been utilizing her oral morphine concentrate, 2.5 mg, she has used this several times per day. She states this does help lessen the severity of her shortness of breath, when it is severe and persistent, but states she continues to have significant episodes, and feels she would benefit from quicker relief. Options were discussed, and we will increase her morphine dose to 5 mg. I assisted her with up refilling all of her syringes to the 5 mill grams dose, and Merlinda Majestic from the [facility] choice program will continue to follow along provide ongoing education, and assessment. Ice make no further changes to her current plan of care, will continue to follow closely. She is encouraged to call with any questions, concerns, or changes in her symptoms. 1/15/2020:  Cristina Bahena is seen in her home today, and she appears to be at her baseline. She continues to use her NIV at night, continuous O2, and is compliant with all of her other medications. She continues to be chronically hypoxic with activity, with O2 sat of 76% after walking approximately 20 ft, but recovered quickly. She continues to have medicare care choice nurse following her.  She does have SOB daily and uses her morphine for persistent SOB, but she typically improves with rest.  She has not Swelling, sores, gum bleeding, easy bruising, pica. MUSCULOSKELETAL:  pain, edema, joint swelling or redness  NEUROLOGICAL:  light headed, dizziness, loss of consciousness, weakness, change in memory, seizures, tremors    Objective:     Physical Exam  There were no vitals taken for this visit. Gen:  Alert, appears stated age, well nourished, in no acute distress  HEENT:  Normocephalic, conjunctiva pink, no drainage, mucosa moist  Neck:  Supple  Lungs:  CTA bilaterally, no audible rhonchi or wheezes noted  Heart:  RRR, no murmur, rub, or gallop noted during exam  Abd:  Soft, non tender, non distended, BS+  Ext:  Moving all extremities, no edema, pulses present  Skin:  Warm and dry  Neuro:  PERRL, Alert, oriented x 3; following commands    [place] Symptom Assessment Score   [place] Score 11/27/2019 10/21/2019 9/25/2019 6/27/2019   Pain Score No pain No pain No pain No pain   Tiredness Score 4 4 4 1   Nausea Score Not nauseated Not nauseated 1 Not nauseated   Depression Score 3 3 2 Not depressed   Anxiety Score 4 3 3 Not anxious   Drowsiness Score 1 1 1 Not drowsy   Appetite Score Best appetite Best appetite Best appetite Best appetite   Wellbeing Score 5 4 3 2   Dyspnea Score 9 8 6 4   Other Problem Score Best possible response Best possible response Best possible response Best possible response   Total Assessment Score(calculated) 26 23 20 7     Assessed by: patient and provider. Current Medications:  Medications reviewed: yes      Rodney Mak APRN-MERCEDES  Palliative Medicine    Time/Communication  Greater than 51% of time spent, total 45 minutes in counseling and coordination of care at the bedside regarding goals of care, symptom management, diagnosis and prognosis and see above. Discussed patient and the plan of care with the other IDT members of the Palliative Care Team, and with patient. Thank you for allowing Palliative Medicine to participate in the care of [address].     Note: This report was completed using computerize voiced recognition software. Every effort has been made to ensure accuracy; however, inadvertent computerized transcription errors may be present.

## 2020-01-23 ENCOUNTER — TELEPHONE (OUTPATIENT)
Dept: PALLATIVE CARE | Age: 85
End: 2020-01-23

## 2020-01-23 NOTE — TELEPHONE ENCOUNTER
Palliative Medicine  Social Work Progress Note    Pt Name: Bruce Mace    Talked with pt and she reports she saw Dr. Dharmesh Lafleur today and he told her there are no other treatment options. Pt shared she is \"tired\" and does not want to return to the hospital anymore. Offered active listening and support. We discussed hospice care and transitioning fully to this program. Pt reports she has talked to her family and she is ready for this. Pt would like [facility] as she is followed by [facility]3] [facility] with  [facility]). Explained a referral will be made today and that this SW will update Vashti Hawthorne NP of palliative. Encouraged pt to please call our office with any additional questions or concerns. Pt voiced understanding. Continued to offer support to pt today.

## 2020-01-28 ENCOUNTER — TELEPHONE (OUTPATIENT)
Dept: PALLATIVE CARE | Age: 85
End: 2020-01-28

## 2020-01-28 NOTE — TELEPHONE ENCOUNTER
Palliative Medicine  Social Work Progress Note    Pt Name: Tawny Valle 40. call to pt to address any needs and to confirm hospice is following pt. Pt answered phone and shared she is \"doig pretty good with life. \" Pt confirmed [facility] is following and stated \"every time I turn around a nurse is here-its going real good. \" Reviewed with pt--hospice team will follow pt and assist with any needs. Pt voiced understanding. Offered support and thanked pt for allowing palliative care to be a part of her care.

## (undated) DEVICE — SUTURE MCRYL SZ 4-0 L18IN ABSRB UD L19MM PS-2 3/8 CIR PRIM Y496G

## (undated) DEVICE — CONTROL SYRINGE LUER-LOCK TIP: Brand: MONOJECT

## (undated) DEVICE — YANKAUER,OPEN TIP,W/O VENT,STERILE: Brand: MEDLINE INDUSTRIES, INC.

## (undated) DEVICE — TOWEL,OR,DSP,ST,BLUE,STD,6/PK,12PK/CS: Brand: MEDLINE

## (undated) DEVICE — SET INST MINOR PROCEDURE

## (undated) DEVICE — PACK,UNIV, II AURORA: Brand: MEDLINE

## (undated) DEVICE — BLADE CLIPPER GEN PURP NS

## (undated) DEVICE — AGENT HEMSTAT 3GM PURIFIED PLNT STARCH PWD ABSRB ARISTA AH

## (undated) DEVICE — GARMENT,MEDLINE,DVT,INT,CALF,MED, GEN2: Brand: MEDLINE

## (undated) DEVICE — SUTURE VCRL SZ 3-0 L27IN ABSRB UD L26MM SH 1/2 CIR J416H

## (undated) DEVICE — STANDARD HYPODERMIC NEEDLE,ALUMINUM HUB: Brand: MONOJECT

## (undated) DEVICE — 1.5L THIN WALL CAN: Brand: CRD

## (undated) DEVICE — SOLUTION IV IRRIG POUR BRL 0.9% SODIUM CHL 2F7124

## (undated) DEVICE — TUBING, SUCTION, 3/16" X 12', STRAIGHT: Brand: MEDLINE

## (undated) DEVICE — SURGICAL PROCEDURE PACK BASIC

## (undated) DEVICE — GLOVE SURG SZ 75 STD WHT LTX SYN POLYMER BEAD REINF ANTI RL

## (undated) DEVICE — PATIENT RETURN ELECTRODE, SINGLE-USE, CONTACT QUALITY MONITORING, ADULT, WITH 9FT CORD, FOR PATIENTS WEIGING OVER 33LBS. (15KG): Brand: MEGADYNE

## (undated) DEVICE — STRIP,CLOSURE,WOUND,MEDI-STRIP,1/2X4: Brand: MEDLINE

## (undated) DEVICE — CHLORAPREP 26ML ORANGE

## (undated) DEVICE — INTENDED FOR TISSUE SEPARATION, AND OTHER PROCEDURES THAT REQUIRE A SHARP SURGICAL BLADE TO PUNCTURE OR CUT.: Brand: BARD-PARKER ® STAINLESS STEEL BLADES

## (undated) DEVICE — SKIN AFFIX SURG ADHESIVE 72/CS 0.55ML: Brand: MEDLINE

## (undated) DEVICE — Device